# Patient Record
Sex: FEMALE | Race: WHITE | NOT HISPANIC OR LATINO | Employment: OTHER | URBAN - METROPOLITAN AREA
[De-identification: names, ages, dates, MRNs, and addresses within clinical notes are randomized per-mention and may not be internally consistent; named-entity substitution may affect disease eponyms.]

---

## 2021-01-14 DIAGNOSIS — Z23 NEED FOR VACCINATION: ICD-10-CM

## 2022-07-20 ENCOUNTER — OFFICE VISIT (OUTPATIENT)
Dept: URGENT CARE | Facility: CLINIC | Age: 82
End: 2022-07-20
Payer: MEDICARE

## 2022-07-20 VITALS
OXYGEN SATURATION: 96 % | HEIGHT: 64 IN | TEMPERATURE: 98.6 F | DIASTOLIC BLOOD PRESSURE: 80 MMHG | HEART RATE: 78 BPM | SYSTOLIC BLOOD PRESSURE: 140 MMHG | BODY MASS INDEX: 23.9 KG/M2 | WEIGHT: 140 LBS | RESPIRATION RATE: 12 BRPM

## 2022-07-20 DIAGNOSIS — S61.451A CAT BITE OF RIGHT HAND, INITIAL ENCOUNTER: ICD-10-CM
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
## 2022-07-20 PROCEDURE — 90714 TD VACC NO PRESV 7 YRS+ IM: CPT | Performed by: PHYSICIAN ASSISTANT

## 2022-07-20 PROCEDURE — 90471 IMMUNIZATION ADMIN: CPT | Performed by: PHYSICIAN ASSISTANT

## 2022-07-20 PROCEDURE — 99204 OFFICE O/P NEW MOD 45 MIN: CPT | Mod: 25 | Performed by: PHYSICIAN ASSISTANT

## 2022-07-20 RX ORDER — AMOXICILLIN AND CLAVULANATE POTASSIUM 500; 125 MG/1; MG/1
1 TABLET, FILM COATED ORAL 2 TIMES DAILY
Qty: 14 TABLET | Refills: 0 | Status: SHIPPED | OUTPATIENT
Start: 2022-07-20 | End: 2022-07-27

## 2022-07-20 ASSESSMENT — ENCOUNTER SYMPTOMS
NAUSEA: 0
FEVER: 0
HEADACHES: 0
TINGLING: 0
DIZZINESS: 0
MYALGIAS: 0
WEAKNESS: 0
CHILLS: 0
PALPITATIONS: 0
VOMITING: 0

## 2022-07-20 NOTE — PROGRESS NOTES
Subjective:     CHIEF COMPLAINT  Chief Complaint   Patient presents with   • Animal Bite     X 1 day, cat bite, on RT hand, puncture wound. Redness, swelling       HPI  Kadie Ashford is a very pleasant 82 y.o. female who presents to the clinic after being bit by her cat in her right hand last night.  Patient almost immediately developed swelling and redness.  States a slight restraining clear fluid.  She outlined the area this morning and the redness is gradually grown.  Currently on minimal pain.  No ascending redness or streaking.  Denies any fevers, chills or myalgias.  No numbness or tingling distally.  Thoroughly irrigated the bite with soap and water.  States she was hospitalized for 5 days approximately 5 years ago for a cat bite that became infected.    REVIEW OF SYSTEMS  Review of Systems   Constitutional: Negative for chills, fever and malaise/fatigue.   Cardiovascular: Negative for chest pain and palpitations.   Gastrointestinal: Negative for nausea and vomiting.   Musculoskeletal: Negative for joint pain and myalgias.   Skin:        Cat bite with surrounding redness to the right hand   Neurological: Negative for dizziness, tingling, weakness and headaches.       PAST MEDICAL HISTORY  There are no problems to display for this patient.      SURGICAL HISTORY   has a past surgical history that includes appendectomy; hysterectomy laparoscopy; and other.    ALLERGIES  Allergies   Allergen Reactions   • Food      Allergic to Scallops.   • Nkda [No Known Drug Allergy]    • Sulfa Drugs        CURRENT MEDICATIONS  Home Medications     Reviewed by Jeovany Fairbanks P.A.-C. (Physician Assistant) on 07/20/22 at 1104  Med List Status: <None>   Medication Last Dose Status   diphenhydrAMINE (BENADRYL) 25 MG capsule Not Taking Active   Estrogens Conjugated (PREMARIN PO) Not Taking Active   hydrocodone-APAP (NORCO) 5-325 MG TABS Not Taking Active   hydrocodone/apap  (NORCO)  MG TABS Not Taking Active  "  hydrocortisone 1 % CREA Not Taking Active   moxifloxacin (AVELOX) 400 MG TABS Not Taking Active   Omeprazole (PRILOSEC PO) Not Taking Active   Omeprazole 20 MG TBEC Not Taking Active   Potassium Chloride (KCL-20 PO) Not Taking Active   sulfamethoxazole-trimethoprim (BACTRIM DS) 800-160 MG tablet Not Taking Active                SOCIAL HISTORY  Social History     Tobacco Use   • Smoking status: Never Smoker   • Smokeless tobacco: Never Used   • Tobacco comment: quit in 1976   Vaping Use   • Vaping Use: Never used   Substance and Sexual Activity   • Alcohol use: Yes     Comment: occ   • Drug use: No   • Sexual activity: Not on file       FAMILY HISTORY  History reviewed. No pertinent family history.       Objective:     VITAL SIGNS: BP (!) 140/80   Pulse 78   Temp 37 °C (98.6 °F) (Temporal)   Resp 12   Ht 1.626 m (5' 4\")   Wt 63.5 kg (140 lb)   SpO2 96%   Breastfeeding No   BMI 24.03 kg/m²     PHYSICAL EXAM  Physical Exam  Constitutional:       Appearance: Normal appearance.   HENT:      Head: Normocephalic and atraumatic.   Eyes:      Conjunctiva/sclera: Conjunctivae normal.   Cardiovascular:      Rate and Rhythm: Normal rate and regular rhythm.      Pulses: Normal pulses.      Heart sounds: Normal heart sounds.   Pulmonary:      Effort: Pulmonary effort is normal.   Musculoskeletal:      Cervical back: Normal range of motion.   Skin:     Comments: Right hand: Healing puncture wound present to the dorsal aspect of the right hand between the first and second digit.  Large area of surrounding erythema measuring approximately 4.5 cm in size.  No active drainage.  No underlying fluctuance.  No ascending redness or streaking.  Sensation intact distally.  Full and pain-free range of motion of the right hand and wrist   Neurological:      General: No focal deficit present.      Mental Status: She is alert and oriented to person, place, and time. Mental status is at baseline.         Assessment/Plan:     1. Cat " bite of right hand, initial encounter  - TD Preservative Free =>8yo IM  - cefTRIAXone (ROCEPHIN) 500 mg, lidocaine (XYLOCAINE) 1 % 1.8 mL for IM use  - amoxicillin-clavulanate (AUGMENTIN) 500-125 MG Tab; Take 1 Tablet by mouth 2 times a day for 7 days.  Dispense: 14 Tablet; Refill: 0      MDM/Comments:    Patient presents with cat bite to her right hand approximately 18 hours ago.  She has had worsening redness over the last 12 hours.  Currently no active discharge or drainage present.  No underlying fluctuance.  Area is warm to the touch.  Will start on empiric antibiotics at this time.  500 mg IM Rocephin provided in clinic.  Will start patient on Augmentin daily.  Currently have no current labs on the patient.  Labs from 12+ years ago revealed kidney function around a GFR of 60.  Patient states she sees her PCP yearly and has not had any mention of decreased kidney function.  We will start the patient on renally dosed course of Augmentin.  Td updated in clinic.  Wound care instructions discussed.  Strict ED precautions discussed for any worsening redness, ascending streaking, worsening pain, fevers, chills, nausea or vomiting.  Patient verbalized understanding of this.    Differential diagnosis, natural history, supportive care, and indications for immediate follow-up discussed. All questions answered. Patient agrees with the plan of care.    Follow-up as needed if symptoms worsen or fail to improve to PCP, Urgent care or Emergency Room.    I have personally reviewed prior external notes and test results pertinent to today's visit.  I have independently reviewed and interpreted all diagnostics ordered during this urgent care acute visit.   Discussed management options (risks,benefits, and alternatives to treatment). Pt expresses understanding and the treatment plan was agreed upon. Questions were encouraged and answered to pt's satisfaction.    Please note that this dictation was created using voice recognition  software. I have made a reasonable attempt to correct obvious errors, but I expect that there are errors of grammar and possibly content that I did not discover before finalizing the note.

## 2023-05-28 ENCOUNTER — OFFICE VISIT (OUTPATIENT)
Dept: URGENT CARE | Facility: CLINIC | Age: 83
End: 2023-05-28
Payer: MEDICARE

## 2023-05-28 VITALS
HEART RATE: 79 BPM | SYSTOLIC BLOOD PRESSURE: 138 MMHG | OXYGEN SATURATION: 96 % | BODY MASS INDEX: 24.24 KG/M2 | RESPIRATION RATE: 16 BRPM | HEIGHT: 64 IN | TEMPERATURE: 97.4 F | DIASTOLIC BLOOD PRESSURE: 86 MMHG | WEIGHT: 142 LBS

## 2023-05-28 DIAGNOSIS — T14.8XXA INFECTED BITE WOUND: ICD-10-CM

## 2023-05-28 DIAGNOSIS — L08.9 INFECTED BITE WOUND: ICD-10-CM
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
## 2023-05-28 DIAGNOSIS — S61.459A CAT BITE OF HAND, INITIAL ENCOUNTER: ICD-10-CM

## 2023-05-28 PROCEDURE — 99213 OFFICE O/P EST LOW 20 MIN: CPT

## 2023-05-28 PROCEDURE — 3079F DIAST BP 80-89 MM HG: CPT

## 2023-05-28 PROCEDURE — 3075F SYST BP GE 130 - 139MM HG: CPT

## 2023-05-28 RX ORDER — FLUOROURACIL 50 MG/G
CREAM TOPICAL
COMMUNITY
End: 2023-05-28

## 2023-05-28 RX ORDER — AMOXICILLIN AND CLAVULANATE POTASSIUM 875; 125 MG/1; MG/1
1 TABLET, FILM COATED ORAL 2 TIMES DAILY
Qty: 14 TABLET | Refills: 0 | Status: SHIPPED | OUTPATIENT
Start: 2023-05-28 | End: 2023-06-04

## 2023-05-28 ASSESSMENT — ENCOUNTER SYMPTOMS
NAUSEA: 0
FEVER: 0
VOMITING: 0
CHILLS: 0

## 2023-05-28 NOTE — PROGRESS NOTES
Subjective     Kadie Ashford is a 83 y.o. female who presents with Animal Bite (X1day, Cat bite, right arm, red, hot to the touch )            Animal Bite  This is a new problem. The current episode started yesterday. The problem has been gradually worsening. Pertinent negatives include no chills, fever, nausea or vomiting. She has tried NSAIDs and ice (wash, hydrogen peroxide) for the symptoms. The treatment provided mild relief.     Patient presents with symptoms that started yesterday.  She reports that she was just making his bed when her cat bit her on the right hand/forearm.  She denies any pain but reports the area to be tender to touch.  She noted redness and increased swelling.  She has been applying ice and taking ibuprofen, providing some relief.  She reports her cat to be up-to-date on its rabies vaccine.      Patient's current problem list, medications, and past medical/surgical history were reviewed in Epic.    PMH:  has no past medical history of Angina, Arrhythmia, Arthritis, ASTHMA, Backpain, Bronchitis, Cancer (HCC), CATARACT, Congestive heart failure (HCC), COPD, Diabetes, Dialysis, Glaucoma, Heart murmur, Heart valve disease, Hypertension, Indigestion, Infectious disease, Jaundice, Myocardial infarct (HCC), Other specified symptom associated with female genital organs, Pacemaker, Personal history of venous thrombosis and embolism, Pneumonia, Psychiatric problem, Renal disorder, Rheumatic fever, Seizure (HCC), Stroke (HCC), Unspecified disorder of thyroid, Unspecified hemorrhagic conditions, or Unspecified urinary incontinence.  MEDS: No current outpatient medications on file.  ALLERGIES:   Allergies   Allergen Reactions    Food      Allergic to Scallops.    Nkda [No Known Drug Allergy]     Sulfa Drugs      SURGHX:   Past Surgical History:   Procedure Laterality Date    APPENDECTOMY      HYSTERECTOMY LAPAROSCOPY      OTHER      Nose SX in 1959     SOCHX:  reports that she has never smoked. She has  "never used smokeless tobacco. She reports that she does not currently use alcohol. She reports that she does not use drugs.  FH: Reviewed with patient, not pertinent to this visit.       Review of Systems   Constitutional:  Negative for chills and fever.   Gastrointestinal:  Negative for nausea and vomiting.   All other systems reviewed and are negative.             Objective     /86   Pulse 79   Temp 36.3 °C (97.4 °F) (Temporal)   Resp 16   Ht 1.626 m (5' 4\")   Wt 64.4 kg (142 lb)   SpO2 96%   BMI 24.37 kg/m²             Assessment & Plan     1. Cat bite of hand, initial encounter    - amoxicillin-clavulanate (AUGMENTIN) 875-125 MG Tab; Take 1 Tablet by mouth 2 times a day for 7 days.  Dispense: 14 Tablet; Refill: 0    2. Infected bite wound    - amoxicillin-clavulanate (AUGMENTIN) 875-125 MG Tab; Take 1 Tablet by mouth 2 times a day for 7 days.  Dispense: 14 Tablet; Refill: 0      Patient presents with infected wound from a cat bite.  She is prescribed Augmentin twice daily for 7 days.  Instructed to complete the 7-day course of antibiotics.  Instructed to keep the area clean and dry, wash with mild soap and water daily.  Continue ibuprofen/or Tylenol as needed for pain relief.  Elevate the area.  Apply ice as needed.  Discussed treatment plan with patient, she is agreeable and verbalized understanding.  Educated patient on signs and symptoms watch out for, when to return to the clinic or go to the ER.      Electronically Signed by ERIK Hinojosa                   "

## 2023-06-01 ENCOUNTER — OFFICE VISIT (OUTPATIENT)
Dept: URGENT CARE | Facility: CLINIC | Age: 83
End: 2023-06-01
Payer: MEDICARE

## 2023-06-01 VITALS
HEIGHT: 64 IN | DIASTOLIC BLOOD PRESSURE: 78 MMHG | SYSTOLIC BLOOD PRESSURE: 130 MMHG | TEMPERATURE: 97.6 F | OXYGEN SATURATION: 96 % | HEART RATE: 76 BPM | WEIGHT: 142 LBS | BODY MASS INDEX: 24.24 KG/M2 | RESPIRATION RATE: 14 BRPM
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
## 2023-06-01 PROCEDURE — 3075F SYST BP GE 130 - 139MM HG: CPT

## 2023-06-01 PROCEDURE — 3078F DIAST BP <80 MM HG: CPT

## 2023-06-01 PROCEDURE — 99213 OFFICE O/P EST LOW 20 MIN: CPT

## 2023-06-01 RX ORDER — AMOXICILLIN AND CLAVULANATE POTASSIUM 875; 125 MG/1; MG/1
1 TABLET, FILM COATED ORAL 2 TIMES DAILY
Qty: 6 TABLET | Refills: 0 | Status: SHIPPED | OUTPATIENT
Start: 2023-06-01 | End: 2023-06-04

## 2023-06-01 NOTE — PROGRESS NOTES
"Subjective:   Kadie Ashford is a 83 y.o. female who presents for Cat Bite (Re-evaluation for a cat bite on 5/28/23. RT arm.)      HPI:    Patient presents to urgent care with her  for wound check  Sustained cat bite on the right forearm 4 days ago  Started on Augmentin by  on 05/28/23  Reports slow improvement since she started the antibiotics.    has been documenting progression with pictures and circling the area of erythema, edema with black marker   states she had rapid improvement the first day, but there has been somewhat slower improvement in the wound in the past day.   Denies fever, chills, night sweats.  Denies n/v/d  Denies arthralgia, numbness/tingling, weakness, pain with ROM      ROS As above in HPI    Medications:    Current Outpatient Medications on File Prior to Visit   Medication Sig Dispense Refill    amoxicillin-clavulanate (AUGMENTIN) 875-125 MG Tab Take 1 Tablet by mouth 2 times a day for 7 days. 14 Tablet 0     No current facility-administered medications on file prior to visit.        Allergies:   Food, Nkda [no known drug allergy], and Sulfa drugs    Problem List:   There is no problem list on file for this patient.       Surgical History:  Past Surgical History:   Procedure Laterality Date    APPENDECTOMY      HYSTERECTOMY LAPAROSCOPY      OTHER      Nose SX in 1959       Past Social Hx:   Social History     Tobacco Use    Smoking status: Never    Smokeless tobacco: Never    Tobacco comments:     quit in 1976   Vaping Use    Vaping Use: Never used   Substance Use Topics    Alcohol use: Not Currently     Comment: occ    Drug use: No          Problem list, medications, and allergies reviewed by myself today in Epic.     Objective:     /78   Pulse 76   Temp 36.4 °C (97.6 °F) (Temporal)   Resp 14   Ht 1.626 m (5' 4\")   Wt 64.4 kg (142 lb)   SpO2 96%   BMI 24.37 kg/m²     Physical Exam  Vitals and nursing note reviewed.   Constitutional:       General: She is " not in acute distress.     Appearance: Normal appearance. She is not ill-appearing or diaphoretic.   HENT:      Head: Normocephalic and atraumatic.   Cardiovascular:      Rate and Rhythm: Normal rate and regular rhythm.      Heart sounds: Normal heart sounds.   Pulmonary:      Effort: Pulmonary effort is normal.      Breath sounds: Normal breath sounds. No stridor. No wheezing, rhonchi or rales.   Chest:      Chest wall: No tenderness.   Abdominal:      General: Bowel sounds are normal.      Palpations: Abdomen is soft.   Musculoskeletal:         General: Swelling present. Normal range of motion.   Skin:     General: Skin is warm and dry.      Capillary Refill: Capillary refill takes less than 2 seconds.      Findings: Erythema present. No rash.      Comments: Dorsum of right distal forearm has large area of erythema, edema. No warmth. There is a central puncture wound with stable eschar. Not tender to palpation. No drainage. +AROM. Strength is normal and symmetric, sensation is intact to light and sharp touch, cap refill is less than 2 seconds, 2+ radial pulse.   Neurological:      Mental Status: She is alert and oriented to person, place, and time.       Assessment/Plan:     Diagnosis and associated orders:   1. Cat bite, subsequent encounter  - amoxicillin-clavulanate (AUGMENTIN) 875-125 MG Tab; Take 1 Tablet by mouth 2 times a day for 3 days.  Dispense: 6 Tablet; Refill: 0        Comments/MDM:     There does not seem to be a retained foreign body, nerve injury, vascular injury, tendon injury, or bone injury. VSS, no signs of systemic infection. Reviewed pictures on patient's 's phone and infection has improved. Will extend antibiotic coverage for an additional 3 days, for a total of 10 days in total, due to concerns of slow progression. Recommended scheduled Tylenol, ice packs and warm compresses, elevation of right arm. Follow up with PCP or return to  in 3 days for wound check if needed. Strict return  to ER precautions reviewed. Patient and her  verbalized understanding and consented to plan of care.           Please note that this dictation was created using voice recognition software. I have made a reasonable attempt to correct obvious errors, but I expect that there are errors of grammar and possibly content that I did not discover before finalizing the note.    This note was electronically signed by Zulay Glover DNP

## 2023-08-26 ENCOUNTER — APPOINTMENT (OUTPATIENT)
Dept: URGENT CARE | Facility: CLINIC | Age: 83
End: 2023-08-26

## 2024-01-01 ENCOUNTER — HOME CARE VISIT (OUTPATIENT)
Dept: HOSPICE | Facility: HOSPICE | Age: 84
End: 2024-01-01
Payer: MEDICARE

## 2024-01-01 ENCOUNTER — TELEPHONE (OUTPATIENT)
Dept: HEALTH INFORMATION MANAGEMENT | Facility: OTHER | Age: 84
End: 2024-01-01

## 2024-01-01 VITALS — RESPIRATION RATE: 40 BRPM | HEART RATE: 176 BPM

## 2024-01-01 VITALS — SYSTOLIC BLOOD PRESSURE: 100 MMHG | DIASTOLIC BLOOD PRESSURE: 60 MMHG | HEART RATE: 76 BPM | RESPIRATION RATE: 20 BRPM

## 2024-01-01 VITALS — RESPIRATION RATE: 16 BRPM | HEART RATE: 100 BPM

## 2024-01-01 PROCEDURE — G0299 HHS/HOSPICE OF RN EA 15 MIN: HCPCS

## 2024-01-01 RX ADMIN — LORAZEPAM 0.5 MG: 2 CONCENTRATE ORAL at 09:15

## 2024-01-01 RX ADMIN — MORPHINE SULFATE 20 MG: 100 SOLUTION ORAL at 09:10

## 2024-01-01 SDOH — ECONOMIC STABILITY: GENERAL

## 2024-01-01 ASSESSMENT — ENCOUNTER SYMPTOMS
FATIGUES EASILY: 1
DRY SKIN: 1
DECREASED ORAL INTAKE: 1
COUGH: 1
MUSCLE WEAKNESS: 1
VOMITING: 1
PAIN LOCATION: STOMACH
INCREASED FATIGUE: 1
SUBJECTIVE PAIN PROGRESSION: UNCHANGED
DRY SKIN: 1
PAIN LOCATION - PAIN SEVERITY: 0/10
INCREASED SLEEPING: 1
LOWEST PAIN SEVERITY IN PAST 24 HOURS: 0/10
CONTUSION: 1
PAIN LOCATION: STOMACH
FATIGUES EASILY: 1
STOOL FREQUENCY: LESS THAN DAILY
SHORTNESS OF BREATH: 1
PAIN LOCATION: STOMACH
DEPRESSED MOOD: 1
SUBJECTIVE PAIN PROGRESSION: GRADUALLY IMPROVING
COUGH: 1
LAST BOWEL MOVEMENT: 67161
NAUSEA: 1
FATIGUE: 1
NAUSEA: 1
PAIN: 1
FATIGUES EASILY: 1
ORTHOPNEA: 1
FATIGUES EASILY: 1
PAIN: 1
DYSPNEA ACTIVITY LEVEL: AFTER AMBULATING MORE THAN 20 FT
COUGH: 1
MUSCLE WEAKNESS: 1
ORTHOPNEA: 1
DECREASED TO NO URINARY OUTPUT: 1
PAIN LOCATION - PAIN FREQUENCY: INTERMITTENT
STOOL FREQUENCY: DAILY
HIGHEST PAIN SEVERITY IN PAST 24 HOURS: 7/10
PAIN LOCATION - PAIN SEVERITY: 3/10
FATIGUE: 1
ORTHOPNEA: 1
BOWEL PATTERN NORMAL: 1
COUGH CHARACTERISTICS: DRY
SHORTNESS OF BREATH: 1
DYSPNEA ACTIVITY LEVEL: AFTER AMBULATING 10 - 20 FT
FATIGUE: 1
PAIN LOCATION - PAIN SEVERITY: 0/10
HIGHEST PAIN SEVERITY IN PAST 24 HOURS: 6/10
MUSCLE WEAKNESS: 1
PAIN LOCATION - RELIEVING FACTORS: LAYING DOWN
FORGETFULNESS: 1
SHORTNESS OF BREATH: 1
LOWEST PAIN SEVERITY IN PAST 24 HOURS: 0/10
PAIN: 1
PAIN LOCATION - EXACERBATING FACTORS: EMPTY STOMACH
PAIN SEVERITY GOAL: 0/10
LAST BOWEL MOVEMENT: 67148
LAST BOWEL MOVEMENT: 67152
DYSPNEA ACTIVITY LEVEL: AFTER AMBULATING MORE THAN 20 FT
PAIN SEVERITY GOAL: 0/10

## 2024-01-01 ASSESSMENT — PAIN SCALES - PAIN ASSESSMENT IN ADVANCED DEMENTIA (PAINAD)
BODYLANGUAGE: 0 - RELAXED.
FACIALEXPRESSION: 0 - SMILING OR INEXPRESSIVE.
NEGVOCALIZATION: 0 - NONE.
CONSOLABILITY: 0 - NO NEED TO CONSOLE.
TOTALSCORE: 2

## 2024-01-01 ASSESSMENT — SOCIAL DETERMINANTS OF HEALTH (SDOH): ACTIVE STRESSOR - HEALTH CHANGES: 1

## 2024-01-01 ASSESSMENT — ACTIVITIES OF DAILY LIVING (ADL): MONEY MANAGEMENT (EXPENSES/BILLS): NEEDS ASSISTANCE

## 2024-04-23 ENCOUNTER — HOSPITAL ENCOUNTER (OUTPATIENT)
Dept: RADIOLOGY | Facility: MEDICAL CENTER | Age: 84
End: 2024-04-23
Attending: FAMILY MEDICINE
Payer: MEDICARE

## 2024-04-23 DIAGNOSIS — R13.10 DYSPHAGIA, UNSPECIFIED TYPE: ICD-10-CM

## 2024-04-23 PROCEDURE — 74220 X-RAY XM ESOPHAGUS 1CNTRST: CPT

## 2024-07-04 ENCOUNTER — OFFICE VISIT (OUTPATIENT)
Dept: URGENT CARE | Facility: CLINIC | Age: 84
End: 2024-07-04
Payer: MEDICARE

## 2024-07-04 VITALS
HEIGHT: 64 IN | BODY MASS INDEX: 21.34 KG/M2 | TEMPERATURE: 98.5 F | HEART RATE: 86 BPM | SYSTOLIC BLOOD PRESSURE: 132 MMHG | DIASTOLIC BLOOD PRESSURE: 70 MMHG | RESPIRATION RATE: 14 BRPM | OXYGEN SATURATION: 98 % | WEIGHT: 125 LBS

## 2024-07-04 DIAGNOSIS — L08.9 CAT BITE OF LEFT HAND WITH INFECTION, INITIAL ENCOUNTER: ICD-10-CM
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
## 2024-07-04 DIAGNOSIS — S61.452A CAT BITE OF LEFT HAND WITH INFECTION, INITIAL ENCOUNTER: ICD-10-CM

## 2024-07-04 PROCEDURE — 99213 OFFICE O/P EST LOW 20 MIN: CPT | Performed by: PHYSICIAN ASSISTANT

## 2024-07-04 PROCEDURE — 3078F DIAST BP <80 MM HG: CPT | Performed by: PHYSICIAN ASSISTANT

## 2024-07-04 PROCEDURE — 3075F SYST BP GE 130 - 139MM HG: CPT | Performed by: PHYSICIAN ASSISTANT

## 2024-07-04 RX ORDER — AMOXICILLIN AND CLAVULANATE POTASSIUM 875; 125 MG/1; MG/1
1 TABLET, FILM COATED ORAL 2 TIMES DAILY
Qty: 14 TABLET | Refills: 0 | Status: SHIPPED | OUTPATIENT
Start: 2024-07-04 | End: 2024-07-11

## 2024-07-04 ASSESSMENT — ENCOUNTER SYMPTOMS
FEVER: 0
CHILLS: 0

## 2024-08-29 ENCOUNTER — APPOINTMENT (OUTPATIENT)
Dept: RADIOLOGY | Facility: MEDICAL CENTER | Age: 84
DRG: 444 | End: 2024-08-29
Attending: EMERGENCY MEDICINE
Payer: MEDICARE

## 2024-08-29 ENCOUNTER — HOSPITAL ENCOUNTER (INPATIENT)
Facility: MEDICAL CENTER | Age: 84
End: 2024-08-29
Attending: EMERGENCY MEDICINE | Admitting: HOSPITALIST
Payer: MEDICARE

## 2024-08-29 DIAGNOSIS — R16.0 LIVER MASSES: ICD-10-CM

## 2024-08-29 DIAGNOSIS — K83.1 OBSTRUCTIVE JAUNDICE: ICD-10-CM

## 2024-08-29 DIAGNOSIS — R17 JAUNDICE: ICD-10-CM

## 2024-08-29 DIAGNOSIS — R93.89 ABNORMAL CT SCAN: ICD-10-CM

## 2024-08-29 PROBLEM — D72.829 LEUKOCYTOSIS: Status: ACTIVE | Noted: 2024-08-29

## 2024-08-29 PROBLEM — R03.0 ELEVATED BLOOD PRESSURE READING: Status: ACTIVE | Noted: 2024-08-29

## 2024-08-29 PROBLEM — D64.9 ANEMIA: Status: ACTIVE | Noted: 2024-08-29

## 2024-08-29 PROBLEM — E87.6 HYPOKALEMIA: Status: ACTIVE | Noted: 2024-08-29

## 2024-08-29 PROBLEM — E87.1 HYPONATREMIA: Status: ACTIVE | Noted: 2024-08-29

## 2024-08-29 PROBLEM — E86.0 DEHYDRATION: Status: ACTIVE | Noted: 2024-08-29

## 2024-08-29 PROBLEM — R74.8 ELEVATED LIVER ENZYMES: Status: ACTIVE | Noted: 2024-08-29

## 2024-08-29 PROBLEM — Z71.89 ACP (ADVANCE CARE PLANNING): Status: ACTIVE | Noted: 2024-08-29

## 2024-08-29 PROBLEM — E43 SEVERE PROTEIN-CALORIE MALNUTRITION (HCC): Status: ACTIVE | Noted: 2024-08-29

## 2024-08-29 LAB
ALBUMIN SERPL BCP-MCNC: 3.3 G/DL (ref 3.2–4.9)
ALBUMIN/GLOB SERPL: 0.9 G/DL
ALP SERPL-CCNC: 536 U/L (ref 30–99)
ALT SERPL-CCNC: 102 U/L (ref 2–50)
ANION GAP SERPL CALC-SCNC: 15 MMOL/L (ref 7–16)
APPEARANCE UR: CLEAR
APTT PPP: 28.2 SEC (ref 24.7–36)
AST SERPL-CCNC: 114 U/L (ref 12–45)
BASOPHILS # BLD AUTO: 0.4 % (ref 0–1.8)
BASOPHILS # BLD: 0.05 K/UL (ref 0–0.12)
BILIRUB SERPL-MCNC: 4.5 MG/DL (ref 0.1–1.5)
BILIRUB UR QL STRIP.AUTO: ABNORMAL
BUN SERPL-MCNC: 11 MG/DL (ref 8–22)
CALCIUM ALBUM COR SERPL-MCNC: 10 MG/DL (ref 8.5–10.5)
CALCIUM SERPL-MCNC: 9.4 MG/DL (ref 8.4–10.2)
CHLORIDE SERPL-SCNC: 95 MMOL/L (ref 96–112)
CO2 SERPL-SCNC: 24 MMOL/L (ref 20–33)
COLOR UR: ABNORMAL
CREAT SERPL-MCNC: 0.6 MG/DL (ref 0.5–1.4)
EOSINOPHIL # BLD AUTO: 0.03 K/UL (ref 0–0.51)
EOSINOPHIL NFR BLD: 0.2 % (ref 0–6.9)
ERYTHROCYTE [DISTWIDTH] IN BLOOD BY AUTOMATED COUNT: 56 FL (ref 35.9–50)
GFR SERPLBLD CREATININE-BSD FMLA CKD-EPI: 88 ML/MIN/1.73 M 2
GLOBULIN SER CALC-MCNC: 3.7 G/DL (ref 1.9–3.5)
GLUCOSE SERPL-MCNC: 145 MG/DL (ref 65–99)
GLUCOSE UR STRIP.AUTO-MCNC: NEGATIVE MG/DL
HCT VFR BLD AUTO: 32.6 % (ref 37–47)
HGB BLD-MCNC: 10.5 G/DL (ref 12–16)
IMM GRANULOCYTES # BLD AUTO: 0.1 K/UL (ref 0–0.11)
IMM GRANULOCYTES NFR BLD AUTO: 0.8 % (ref 0–0.9)
INR PPP: 1.09 (ref 0.87–1.13)
KETONES UR STRIP.AUTO-MCNC: NEGATIVE MG/DL
LEUKOCYTE ESTERASE UR QL STRIP.AUTO: NEGATIVE
LIPASE SERPL-CCNC: 33 U/L (ref 11–82)
LYMPHOCYTES # BLD AUTO: 1.34 K/UL (ref 1–4.8)
LYMPHOCYTES NFR BLD: 11.1 % (ref 22–41)
MCH RBC QN AUTO: 26.1 PG (ref 27–33)
MCHC RBC AUTO-ENTMCNC: 32.2 G/DL (ref 32.2–35.5)
MCV RBC AUTO: 81.1 FL (ref 81.4–97.8)
MICRO URNS: ABNORMAL
MONOCYTES # BLD AUTO: 1.49 K/UL (ref 0–0.85)
MONOCYTES NFR BLD AUTO: 12.4 % (ref 0–13.4)
NEUTROPHILS # BLD AUTO: 9.05 K/UL (ref 1.82–7.42)
NEUTROPHILS NFR BLD: 75.1 % (ref 44–72)
NITRITE UR QL STRIP.AUTO: NEGATIVE
NRBC # BLD AUTO: 0 K/UL
NRBC BLD-RTO: 0 /100 WBC (ref 0–0.2)
PH UR STRIP.AUTO: 6.5 [PH] (ref 5–8)
PLATELET # BLD AUTO: 500 K/UL (ref 164–446)
PMV BLD AUTO: 9.2 FL (ref 9–12.9)
POTASSIUM SERPL-SCNC: 3.5 MMOL/L (ref 3.6–5.5)
PROT SERPL-MCNC: 7 G/DL (ref 6–8.2)
PROT UR QL STRIP: NEGATIVE MG/DL
PROTHROMBIN TIME: 14.6 SEC (ref 12–14.6)
RBC # BLD AUTO: 4.02 M/UL (ref 4.2–5.4)
RBC UR QL AUTO: NEGATIVE
SODIUM SERPL-SCNC: 134 MMOL/L (ref 135–145)
SP GR UR STRIP.AUTO: <=1.005
WBC # BLD AUTO: 12.1 K/UL (ref 4.8–10.8)

## 2024-08-29 PROCEDURE — 99285 EMERGENCY DEPT VISIT HI MDM: CPT

## 2024-08-29 PROCEDURE — 83690 ASSAY OF LIPASE: CPT

## 2024-08-29 PROCEDURE — 85610 PROTHROMBIN TIME: CPT

## 2024-08-29 PROCEDURE — 700102 HCHG RX REV CODE 250 W/ 637 OVERRIDE(OP): Performed by: HOSPITALIST

## 2024-08-29 PROCEDURE — 99497 ADVNCD CARE PLAN 30 MIN: CPT | Performed by: HOSPITALIST

## 2024-08-29 PROCEDURE — 99223 1ST HOSP IP/OBS HIGH 75: CPT | Mod: 25,AI | Performed by: HOSPITALIST

## 2024-08-29 PROCEDURE — 770001 HCHG ROOM/CARE - MED/SURG/GYN PRIV*

## 2024-08-29 PROCEDURE — 74177 CT ABD & PELVIS W/CONTRAST: CPT

## 2024-08-29 PROCEDURE — 770006 HCHG ROOM/CARE - MED/SURG/GYN SEMI*

## 2024-08-29 PROCEDURE — 85730 THROMBOPLASTIN TIME PARTIAL: CPT

## 2024-08-29 PROCEDURE — 80053 COMPREHEN METABOLIC PANEL: CPT

## 2024-08-29 PROCEDURE — 81003 URINALYSIS AUTO W/O SCOPE: CPT

## 2024-08-29 PROCEDURE — 71045 X-RAY EXAM CHEST 1 VIEW: CPT

## 2024-08-29 PROCEDURE — 36415 COLL VENOUS BLD VENIPUNCTURE: CPT

## 2024-08-29 PROCEDURE — 700117 HCHG RX CONTRAST REV CODE 255: Performed by: EMERGENCY MEDICINE

## 2024-08-29 PROCEDURE — 94760 N-INVAS EAR/PLS OXIMETRY 1: CPT

## 2024-08-29 PROCEDURE — A9270 NON-COVERED ITEM OR SERVICE: HCPCS | Performed by: HOSPITALIST

## 2024-08-29 PROCEDURE — 85025 COMPLETE CBC W/AUTO DIFF WBC: CPT

## 2024-08-29 RX ORDER — ONDANSETRON 2 MG/ML
4 INJECTION INTRAMUSCULAR; INTRAVENOUS EVERY 4 HOURS PRN
Status: DISCONTINUED | OUTPATIENT
Start: 2024-08-29 | End: 2024-09-01 | Stop reason: HOSPADM

## 2024-08-29 RX ORDER — OXYCODONE HYDROCHLORIDE 5 MG/1
2.5 TABLET ORAL
Status: DISCONTINUED | OUTPATIENT
Start: 2024-08-29 | End: 2024-09-01 | Stop reason: HOSPADM

## 2024-08-29 RX ORDER — ACETAMINOPHEN 325 MG/1
650 TABLET ORAL EVERY 6 HOURS PRN
Status: DISCONTINUED | OUTPATIENT
Start: 2024-08-29 | End: 2024-09-01 | Stop reason: HOSPADM

## 2024-08-29 RX ORDER — HYDROXYZINE HYDROCHLORIDE 25 MG/1
1 TABLET, FILM COATED ORAL 2 TIMES DAILY PRN
COMMUNITY
Start: 2024-04-17

## 2024-08-29 RX ORDER — HYDROMORPHONE HYDROCHLORIDE 1 MG/ML
0.25 INJECTION, SOLUTION INTRAMUSCULAR; INTRAVENOUS; SUBCUTANEOUS
Status: DISCONTINUED | OUTPATIENT
Start: 2024-08-29 | End: 2024-09-01 | Stop reason: HOSPADM

## 2024-08-29 RX ORDER — AMOXICILLIN 250 MG
2 CAPSULE ORAL 2 TIMES DAILY
Status: DISCONTINUED | OUTPATIENT
Start: 2024-08-29 | End: 2024-09-01 | Stop reason: HOSPADM

## 2024-08-29 RX ORDER — VITAMIN B COMPLEX
2000 TABLET ORAL DAILY
COMMUNITY

## 2024-08-29 RX ORDER — ONDANSETRON 4 MG/1
4 TABLET, ORALLY DISINTEGRATING ORAL EVERY 4 HOURS PRN
Status: DISCONTINUED | OUTPATIENT
Start: 2024-08-29 | End: 2024-09-01 | Stop reason: HOSPADM

## 2024-08-29 RX ORDER — LABETALOL HYDROCHLORIDE 5 MG/ML
10 INJECTION, SOLUTION INTRAVENOUS EVERY 4 HOURS PRN
Status: DISCONTINUED | OUTPATIENT
Start: 2024-08-29 | End: 2024-09-01 | Stop reason: HOSPADM

## 2024-08-29 RX ORDER — OXYCODONE HYDROCHLORIDE 5 MG/1
5 TABLET ORAL
Status: DISCONTINUED | OUTPATIENT
Start: 2024-08-29 | End: 2024-09-01 | Stop reason: HOSPADM

## 2024-08-29 RX ORDER — IBUPROFEN 200 MG
200 TABLET ORAL EVERY 6 HOURS PRN
COMMUNITY

## 2024-08-29 RX ORDER — POLYETHYLENE GLYCOL 3350 17 G/17G
1 POWDER, FOR SOLUTION ORAL
Status: DISCONTINUED | OUTPATIENT
Start: 2024-08-29 | End: 2024-09-01 | Stop reason: HOSPADM

## 2024-08-29 RX ORDER — ONDANSETRON 4 MG/1
4 TABLET, ORALLY DISINTEGRATING ORAL EVERY 6 HOURS PRN
COMMUNITY
Start: 2024-04-17

## 2024-08-29 RX ADMIN — OXYCODONE HYDROCHLORIDE 2.5 MG: 5 TABLET ORAL at 17:50

## 2024-08-29 RX ADMIN — IOHEXOL 100 ML: 350 INJECTION, SOLUTION INTRAVENOUS at 15:59

## 2024-08-29 RX ADMIN — OXYCODONE HYDROCHLORIDE 2.5 MG: 5 TABLET ORAL at 21:15

## 2024-08-29 SDOH — ECONOMIC STABILITY: TRANSPORTATION INSECURITY
IN THE PAST 12 MONTHS, HAS THE LACK OF TRANSPORTATION KEPT YOU FROM MEDICAL APPOINTMENTS OR FROM GETTING MEDICATIONS?: NO

## 2024-08-29 SDOH — ECONOMIC STABILITY: TRANSPORTATION INSECURITY
IN THE PAST 12 MONTHS, HAS LACK OF RELIABLE TRANSPORTATION KEPT YOU FROM MEDICAL APPOINTMENTS, MEETINGS, WORK OR FROM GETTING THINGS NEEDED FOR DAILY LIVING?: NO

## 2024-08-29 ASSESSMENT — LIFESTYLE VARIABLES
TOTAL SCORE: 0
AVERAGE NUMBER OF DAYS PER WEEK YOU HAVE A DRINK CONTAINING ALCOHOL: 0
HAVE PEOPLE ANNOYED YOU BY CRITICIZING YOUR DRINKING: NO
CONSUMPTION TOTAL: NEGATIVE
HAVE YOU EVER FELT YOU SHOULD CUT DOWN ON YOUR DRINKING: NO
TOTAL SCORE: 0
EVER HAD A DRINK FIRST THING IN THE MORNING TO STEADY YOUR NERVES TO GET RID OF A HANGOVER: NO
ALCOHOL_USE: NO
DOES PATIENT WANT TO STOP DRINKING: NO
ON A TYPICAL DAY WHEN YOU DRINK ALCOHOL HOW MANY DRINKS DO YOU HAVE: 0
EVER FELT BAD OR GUILTY ABOUT YOUR DRINKING: NO
HOW MANY TIMES IN THE PAST YEAR HAVE YOU HAD 5 OR MORE DRINKS IN A DAY: 0
TOTAL SCORE: 0

## 2024-08-29 ASSESSMENT — COGNITIVE AND FUNCTIONAL STATUS - GENERAL
MOBILITY SCORE: 21
STANDING UP FROM CHAIR USING ARMS: A LITTLE
SUGGESTED CMS G CODE MODIFIER DAILY ACTIVITY: CH
WALKING IN HOSPITAL ROOM: A LITTLE
DAILY ACTIVITIY SCORE: 24
SUGGESTED CMS G CODE MODIFIER MOBILITY: CJ
CLIMB 3 TO 5 STEPS WITH RAILING: A LITTLE

## 2024-08-29 ASSESSMENT — ENCOUNTER SYMPTOMS
EYE DISCHARGE: 0
FEVER: 0
FOCAL WEAKNESS: 0
COUGH: 0
VOMITING: 0
SHORTNESS OF BREATH: 0
BRUISES/BLEEDS EASILY: 0
MYALGIAS: 0
EYE REDNESS: 0
CHILLS: 0
FLANK PAIN: 0
ABDOMINAL PAIN: 0
NERVOUS/ANXIOUS: 0
STRIDOR: 0

## 2024-08-29 ASSESSMENT — PATIENT HEALTH QUESTIONNAIRE - PHQ9
1. LITTLE INTEREST OR PLEASURE IN DOING THINGS: NOT AT ALL
2. FEELING DOWN, DEPRESSED, IRRITABLE, OR HOPELESS: NOT AT ALL
SUM OF ALL RESPONSES TO PHQ9 QUESTIONS 1 AND 2: 0
1. LITTLE INTEREST OR PLEASURE IN DOING THINGS: NOT AT ALL
2. FEELING DOWN, DEPRESSED, IRRITABLE, OR HOPELESS: NOT AT ALL
SUM OF ALL RESPONSES TO PHQ9 QUESTIONS 1 AND 2: 0

## 2024-08-29 ASSESSMENT — SOCIAL DETERMINANTS OF HEALTH (SDOH)
WITHIN THE PAST 12 MONTHS, YOU WORRIED THAT YOUR FOOD WOULD RUN OUT BEFORE YOU GOT THE MONEY TO BUY MORE: NEVER TRUE
IN THE PAST 12 MONTHS, HAS THE ELECTRIC, GAS, OIL, OR WATER COMPANY THREATENED TO SHUT OFF SERVICE IN YOUR HOME?: NO
WITHIN THE LAST YEAR, HAVE YOU BEEN AFRAID OF YOUR PARTNER OR EX-PARTNER?: NO
WITHIN THE PAST 12 MONTHS, THE FOOD YOU BOUGHT JUST DIDN'T LAST AND YOU DIDN'T HAVE MONEY TO GET MORE: NEVER TRUE
WITHIN THE LAST YEAR, HAVE YOU BEEN KICKED, HIT, SLAPPED, OR OTHERWISE PHYSICALLY HURT BY YOUR PARTNER OR EX-PARTNER?: NO
WITHIN THE LAST YEAR, HAVE YOU BEEN HUMILIATED OR EMOTIONALLY ABUSED IN OTHER WAYS BY YOUR PARTNER OR EX-PARTNER?: NO
WITHIN THE LAST YEAR, HAVE TO BEEN RAPED OR FORCED TO HAVE ANY KIND OF SEXUAL ACTIVITY BY YOUR PARTNER OR EX-PARTNER?: NO

## 2024-08-29 ASSESSMENT — PAIN DESCRIPTION - PAIN TYPE
TYPE: ACUTE PAIN
TYPE: ACUTE PAIN

## 2024-08-29 ASSESSMENT — FIBROSIS 4 INDEX: FIB4 SCORE: 1.92

## 2024-08-29 NOTE — ED NOTES
Pharmacy Medication Reconciliation      ~Medication reconciliation updated and complete per patient   ~Allergies have been verified and updated   ~No oral ABX within the last 30 days  ~Patient home pharmacy :  [facility] [phone]      ~Anticoagulants (rivaroxaban, apixaban, edoxaban, dabigatran, warfarin, enoxaparin) taken in the last 14 days? No

## 2024-08-29 NOTE — ED TRIAGE NOTES
Pt presents with daughter from home for RUQ pain, nausea, weakness, and decreased appetite with weight loss. +jaundice. No fever. Gallbladder is intact. Pt A&Ox4 and ambulatory.     Hx of bleeding ulcer.

## 2024-08-29 NOTE — H&P
Hospital Medicine History & Physical Note    Date of Service  8/29/2024    Primary Care Physician  Dale Cornejo M.D.    Consultants  JERED, Dr Mckay        Code Status  Full Code    Chief Complaint  Chief Complaint   Patient presents with    Abdominal Pain     History of Presenting Illness  Kadie Ashford is a 84 y.o. female with no known significant past medical history who presented 8/29/2024 with right upper quadrant, jaundice abdominal pain, generalized weakness and anorexia.  The patient has been having progressively worsening generalized weakness, easy fatigability and jaundice over the past few months.  The pain was initially mild but now is moderate.  She has noticed dark-colored urine and intermittent episodes of light-colored stools.  She denies having itching.  She denies having fevers or chills.      I discussed the plan of care with emergency physician, the patient and patient daughter and patient  present at bedside in the emergency room    Review of Systems  Review of Systems   Constitutional:  Positive for malaise/fatigue. Negative for chills and fever.   Eyes:  Negative for discharge and redness.   Respiratory:  Negative for cough, shortness of breath and stridor.    Cardiovascular:  Negative for chest pain and leg swelling.   Gastrointestinal:  Negative for abdominal pain and vomiting.   Genitourinary:  Negative for flank pain.   Musculoskeletal:  Negative for myalgias.   Skin:         Yellowish discoloration of skin and mucous membranes   Neurological:  Negative for focal weakness.   Endo/Heme/Allergies:  Does not bruise/bleed easily.   Psychiatric/Behavioral:  The patient is not nervous/anxious.      Past Medical History   has no past medical history on file.    Surgical History   has a past surgical history that includes appendectomy; hysterectomy laparoscopy; and other.     Family History  family history is not on file.      Social History   reports that she has never smoked. She  has never used smokeless tobacco. She reports that she does not currently use alcohol. She reports that she does not use drugs.    Allergies  Allergies   Allergen Reactions    Food      Allergic to Scallops.    Nkda [No Known Drug Allergy]     Sulfa Drugs      Medications  Prior to Admission Medications   Prescriptions Last Dose Informant Patient Reported? Taking?   Polyethyl Glycol-Propyl Glycol (LUBRICATING EYE DROPS OP) 8/28/2024 at am Patient, Family Member Yes Yes   Sig: Administer 1 Drop into affected eye(s) 1 time a day as needed (dry eyes).   hydrOXYzine HCl (ATARAX) 25 MG Tab 8/29/2024 at 1000 Patient, Family Member Yes Yes   Sig: Take 1 Tablet by mouth 2 times a day as needed.   ibuprofen (MOTRIN) 200 MG Tab few days ago at prn Patient, Family Member Yes Yes   Sig: Take 200 mg by mouth every 6 hours as needed for Mild Pain.   omeprazole (PRILOSEC) 20 MG delayed-release capsule 8/29/2024 at am Patient, Family Member Yes Yes   Sig: Take 20 mg by mouth every day.   ondansetron (ZOFRAN ODT) 4 MG TABLET DISPERSIBLE a couple weeks ago at prn Patient, Family Member Yes Yes   Sig: Take 4 mg by mouth every 6 hours as needed for Nausea/Vomiting.   vitamin D3 (CHOLECALCIFEROL) 1000 Unit (25 mcg) Tab 8/28/2024 at am Patient, Family Member Yes Yes   Sig: Take 2,000 Units by mouth every day.      Facility-Administered Medications: None     Physical Exam  Temp:  [36.1 °C (97 °F)-37.1 °C (98.7 °F)] 36.1 °C (97 °F)  Pulse:  [80-89] 88  Resp:  [18] 18  BP: (136-176)/() 171/76  SpO2:  [92 %-100 %] 100 %  Blood Pressure : (!) 148/69   Temperature: 37.1 °C (98.7 °F)   Pulse: 86   Respiration: 18   Pulse Oximetry: 97 %     Physical Exam  Constitutional:       General: She is not in acute distress.  HENT:      Head: Normocephalic and atraumatic.      Right Ear: External ear normal.      Left Ear: External ear normal.      Nose: No congestion or rhinorrhea.      Mouth/Throat:      Mouth: Mucous membranes are dry.       "Pharynx: No oropharyngeal exudate or posterior oropharyngeal erythema.   Eyes:      General: Scleral icterus present.         Right eye: No discharge.         Left eye: No discharge.      Conjunctiva/sclera: Conjunctivae normal.      Pupils: Pupils are equal, round, and reactive to light.   Cardiovascular:      Rate and Rhythm: Normal rate and regular rhythm.      Heart sounds:      No friction rub. No gallop.   Pulmonary:      Effort: Pulmonary effort is normal.   Abdominal:      General: Abdomen is flat.      Tenderness: There is abdominal tenderness (Right upper quadrant). There is no guarding or rebound.      Comments: Protuberant abdomen   Musculoskeletal:         General: No swelling.      Cervical back: Neck supple. No rigidity. No muscular tenderness.      Right lower leg: No edema.      Left lower leg: No edema.   Skin:     Capillary Refill: Capillary refill takes 2 to 3 seconds.      Coloration: Skin is jaundiced. Skin is not pale.      Findings: No bruising or erythema.   Neurological:      Mental Status: She is alert and oriented to person, place, and time.   Psychiatric:         Mood and Affect: Mood normal.         Judgment: Judgment normal.       Laboratory:  Recent Labs     08/29/24  1405   WBC 12.1*   RBC 4.02*   HEMOGLOBIN 10.5*   HEMATOCRIT 32.6*   MCV 81.1*   MCH 26.1*   MCHC 32.2   RDW 56.0*   PLATELETCT 500*   MPV 9.2     Recent Labs     08/29/24  1405   SODIUM 134*   POTASSIUM 3.5*   CHLORIDE 95*   CO2 24   GLUCOSE 145*   BUN 11   CREATININE 0.60   CALCIUM 9.4     Recent Labs     08/29/24  1405   ALTSGPT 102*   ASTSGOT 114*   ALKPHOSPHAT 536*   TBILIRUBIN 4.5*   LIPASE 33   GLUCOSE 145*     Recent Labs     08/29/24  1405   APTT 28.2   INR 1.09     No results for input(s): \"NTPROBNP\" in the last 72 hours.      No results for input(s): \"TROPONINT\" in the last 72 hours.    Imaging:  CT-ABDOMEN-PELVIS WITH   Final Result         1. There is extensive neoplastic involvement of the liver and " gallbladder.   2. Intrahepatic biliary ductal dilatation.   3. Upper abdominal lymphadenopathy.   4. Atherosclerosis including coronary artery disease.      DX-CHEST-PORTABLE (1 VIEW)   Final Result      Mild left basilar atelectasis versus infiltrate.        Assessment/Plan:  Justification for Admission Status  I anticipate this patient will require at least two midnights for appropriate medical management, necessitating inpatient admission because the patient has obstructive jaundice.  She will require GI consultation and ERCP for stent placement.    Patient will need a medical bed on medical service     * Obstructive jaundice- (present on admission)  Assessment & Plan  Presenting with progressively worsening right upper quadrant abdominal pain and jaundice  Likely multifactorial secondary to metastatic versus primary malignancy and biliary obstruction.  GI consulted to consider stent placement and endoscopic ultrasound.  Avoid hepatotoxins as much as possible.  I will order follow-up CMP     Abnormal CT scan showing extensive neoplastic involvement of the liver and gallbladder- (present on admission)  Assessment & Plan  Abnormal CT scan showing extensive neoplastic involvement of the liver and gallbladder   In addition there are multiple enlarged lymph nodes.  Case was discussed with surgical oncology, Dr. Llamas who recommended GI consultation to consider stent placement and endoscopic ultrasound for biopsy.  GI, Dr. Mckay consulted  The patient will need to follow-up with surgical oncology    Elevated liver enzymes- (present on admission)  Assessment & Plan  Likely multifactorial secondary to metastatic versus primary malignancy and biliary obstruction.  GI consulted to consider stent placement and endoscopic ultrasound.  Avoid hepatotoxins as much as possible.  I will order follow-up CMP    Severe protein-calorie malnutrition (HCC)- (present on admission)  Assessment & Plan  Patient has been having  progressively worsening anorexia with reduced oral intake over the past 1 month  The patient has temporal muscle wasting.  The patient has thenar and hypothenar wasting.   I will start on nutritional boost supplements.  Nutrition consult.  I will monitor his magnesium and phosphorus for potential refeeding.     Elevated blood pressure reading- (present on admission)  Assessment & Plan  No known history of primary hypertension  Likely secondary to severe pain  Multimodal pain control  I will start as needed labetalol for extreme hypertension  Consider starting scheduled antihypertensive medications according to blood pressure trend     Dehydration- (present on admission)  Assessment & Plan  Likely secondary to reduced oral intake   Encourage oral intake as tolerated, antiemetics as needed.  Intravenous hydration until adequate oral intake is achieved     Hypokalemia- (present on admission)  Assessment & Plan  Replacing, checking magnesium    Continue to monitor replace as needed     Hyponatremia- (present on admission)  Assessment & Plan  Hypovolemic hyponatremia  I expect Na to improve with IVF     Anemia- (present on admission)  Assessment & Plan  Likely multifactorial secondary to metastatic cancer and poor nutrition.  Monitor hemoglobin. I will order a follow-up CBC.  Transfuse for a hemoglobin of less than or equal to 7      Leukocytosis- (present on admission)  Assessment & Plan  Likely reactive secondary to metastatic cancer and dehydration  No focal sign of infection at this point   Continue to montior off antibiotics for now   I will order a follow-up CBC    ACP (advance care planning)- (present on admission)  Assessment & Plan  I had a discussion with the patient and family [ and daughter present at bedside in the emergency room] regarding goals of care, diagnoses, prognosis, and CODE STATUS. We discussed her prognosis and comorbidities.  The patient has advanced age of 84 years.  She has relatively  few comorbid conditions until recently.  She is presenting with multiple acute medical problems including neoplastic changes in the gallbladder and liver highly concerning for metastatic malignancy with surrounding lymphadenopathy.  In addition she has severe dehydration with multiple electrolyte abnormalities.  At this point the patient wants to proceed with a full code.  She is open to all forms of invasive and invasive diagnostic and therapeutic interventions.        VTE prophylaxis: SCDs/TEDs    I had a discussion with the patient and family [ and daughter present at bedside in the emergency room] regarding goals of care, diagnoses, prognosis, and CODE STATUS. We discussed her prognosis and comorbidities.  The patient has advanced age of 84 years.  She has relatively few comorbid conditions until recently.  She is presenting with multiple acute medical problems including neoplastic changes in the gallbladder and liver highly concerning for metastatic malignancy with surrounding lymphadenopathy.  In addition she has severe dehydration with multiple electrolyte abnormalities.  At this point the patient wants to proceed with a full code.  She is open to all forms of invasive and invasive diagnostic and therapeutic interventions. I spent 19 minutes on advanced care planning

## 2024-08-29 NOTE — ED NOTES
Pt ambulatory to ER room 2 with a slow gait and assistance from her daughter.  Pt in a gown.  Warm blanket provided.  Pt placed on monitor.  Pt rates her RUQ abdominal pain 5/10.  PIV established.  Labs drawn.  Awaiting ERP evaluation.

## 2024-08-29 NOTE — ED PROVIDER NOTES
ED Provider Note    CHIEF COMPLAINT  Chief Complaint   Patient presents with    Abdominal Pain       EXTERNAL RECORDS REVIEWED  Outpatient Notes patient was seen [facility] 8/7/2024 for GERD without esophagitis    HPI/ROS  LIMITATION TO HISTORY   Select: : None  OUTSIDE HISTORIAN(S):  Family patient's daughter states that she has had trouble with her appetite being decreased for months with intermittent right upper quadrant pain and that she has been become more jaundiced over the last month.  She is on medication for acid reflux.    Kadie Ashford is a 84 y.o. female who presents complaining of epigastric and right upper quadrant abdominal pain that has been ongoing for the last 6 months.  Her daughter has noticed that she has getting a more yellow tinge to her skin over the last month.  She states that she had blood work with her primary care physician in June which was unremarkable.  She has been placed on Prilosec acid reflux and hydroxyzine for anxiety.  The patient has had a 20 pound weight loss since January.  She does have sweating at night.  She denies any smoking drinking or drug use.  She has all of her internal organs.  She did have abdominal surgery in her 20s for a perforated ulcer.  She denies any dysuria frequency urgency fevers or chills cough or cold symptoms.  She does have intermittent shortness of breath.    PAST MEDICAL HISTORY       SURGICAL HISTORY   has a past surgical history that includes appendectomy; hysterectomy laparoscopy; and other.    FAMILY HISTORY  History reviewed. No pertinent family history.    SOCIAL HISTORY  Social History     Tobacco Use    Smoking status: Never    Smokeless tobacco: Never    Tobacco comments:     quit in 1976   Vaping Use    Vaping status: Never Used   Substance and Sexual Activity    Alcohol use: Not Currently     Comment: occ    Drug use: No    Sexual activity: Not on file       CURRENT MEDICATIONS  Home Medications       Reviewed by  "Ayaka Urias PhT (Pharmacy Tech) on 08/29/24 at 1432  Med List Status: <None>     Medication Last Dose Status   hydrOXYzine HCl (ATARAX) 25 MG Tab 8/29/2024 Active   ibuprofen (MOTRIN) 200 MG Tab few days ago Active   omeprazole (PRILOSEC) 20 MG delayed-release capsule 8/29/2024 Active   ondansetron (ZOFRAN ODT) 4 MG TABLET DISPERSIBLE a couple weeks ago Active   Polyethyl Glycol-Propyl Glycol (LUBRICATING EYE DROPS OP) 8/28/2024 Active   vitamin D3 (CHOLECALCIFEROL) 1000 Unit (25 mcg) Tab 8/28/2024 Active                  Audit from Redirected Encounters    **Home medications have not yet been reviewed for this encounter**         ALLERGIES  Allergies   Allergen Reactions    Food      Allergic to Scallops.    Nkda [No Known Drug Allergy]     Sulfa Drugs        PHYSICAL EXAM  VITAL SIGNS: /72   Pulse 89   Temp 37.1 °C (98.7 °F) (Temporal)   Resp 18   Ht 1.626 m (5' 4\")   Wt 52.6 kg (116 lb)   SpO2 92%   BMI 19.91 kg/m²      Constitutional: Well developed, Well nourished, No acute distress, Non-toxic appearance.   HEENT: Normocephalic, Atraumatic,  external ears normal, pharynx pink,  Mucous  Membranes moist, No rhinorrhea or mucosal edema  Eyes: PERRL, EOMI, Conjunctiva normal, No discharge.   Neck: Normal range of motion, No tenderness, Supple, No stridor.   Lymphatic: No lymphadenopathy    Cardiovascular: Regular Rate and Rhythm, No murmurs,  rubs, or gallops.   Thorax & Lungs: Lungs clear to auscultation bilaterally, No respiratory distress, No wheezes, rhales or rhonchi, No chest wall tenderness.   Abdomen: Bowel sounds normal, Soft, non tender, non distended,  No pulsatile masses., no rebound guarding or peritoneal signs.   Skin: Warm, Dry, No erythema, No rash,   Back:  No CVA tenderness,  No spinal tenderness, bony crepitance step offs or instability.   Extremities: Equal, intact distal pulses, No cyanosis, clubbing or edema,  No tenderness.   Musculoskeletal: Good range of motion in all " major joints. No tenderness to palpation or major deformities noted.   Neurologic: Alert & oriented No focal deficits noted.  Psychiatric: Affect normal, Judgment normal, Mood normal.      EKG/LABS  Results for orders placed or performed during the hospital encounter of 08/29/24   CBC with Differential   Result Value Ref Range    WBC 12.1 (H) 4.8 - 10.8 K/uL    RBC 4.02 (L) 4.20 - 5.40 M/uL    Hemoglobin 10.5 (L) 12.0 - 16.0 g/dL    Hematocrit 32.6 (L) 37.0 - 47.0 %    MCV 81.1 (L) 81.4 - 97.8 fL    MCH 26.1 (L) 27.0 - 33.0 pg    MCHC 32.2 32.2 - 35.5 g/dL    RDW 56.0 (H) 35.9 - 50.0 fL    Platelet Count 500 (H) 164 - 446 K/uL    MPV 9.2 9.0 - 12.9 fL    Neutrophils-Polys 75.10 (H) 44.00 - 72.00 %    Lymphocytes 11.10 (L) 22.00 - 41.00 %    Monocytes 12.40 0.00 - 13.40 %    Eosinophils 0.20 0.00 - 6.90 %    Basophils 0.40 0.00 - 1.80 %    Immature Granulocytes 0.80 0.00 - 0.90 %    Nucleated RBC 0.00 0.00 - 0.20 /100 WBC    Neutrophils (Absolute) 9.05 (H) 1.82 - 7.42 K/uL    Lymphs (Absolute) 1.34 1.00 - 4.80 K/uL    Monos (Absolute) 1.49 (H) 0.00 - 0.85 K/uL    Eos (Absolute) 0.03 0.00 - 0.51 K/uL    Baso (Absolute) 0.05 0.00 - 0.12 K/uL    Immature Granulocytes (abs) 0.10 0.00 - 0.11 K/uL    NRBC (Absolute) 0.00 K/uL   Complete Metabolic Panel   Result Value Ref Range    Sodium 134 (L) 135 - 145 mmol/L    Potassium 3.5 (L) 3.6 - 5.5 mmol/L    Chloride 95 (L) 96 - 112 mmol/L    Co2 24 20 - 33 mmol/L    Anion Gap 15.0 7.0 - 16.0    Glucose 145 (H) 65 - 99 mg/dL    Bun 11 8 - 22 mg/dL    Creatinine 0.60 0.50 - 1.40 mg/dL    Calcium 9.4 8.4 - 10.2 mg/dL    Correct Calcium 10.0 8.5 - 10.5 mg/dL    AST(SGOT) 114 (H) 12 - 45 U/L    ALT(SGPT) 102 (H) 2 - 50 U/L    Alkaline Phosphatase 536 (H) 30 - 99 U/L    Total Bilirubin 4.5 (H) 0.1 - 1.5 mg/dL    Albumin 3.3 3.2 - 4.9 g/dL    Total Protein 7.0 6.0 - 8.2 g/dL    Globulin 3.7 (H) 1.9 - 3.5 g/dL    A-G Ratio 0.9 g/dL   Lipase   Result Value Ref Range    Lipase 33 11 - 82  U/L   PT/INR   Result Value Ref Range    PT 14.6 12.0 - 14.6 sec    INR 1.09 0.87 - 1.13   PTT   Result Value Ref Range    APTT 28.2 24.7 - 36.0 sec   ESTIMATED GFR   Result Value Ref Range    GFR (CKD-EPI) 88 >60 mL/min/1.73 m 2       I have independently interpreted this EKG    RADIOLOGY/PROCEDURES   I have independently interpreted the diagnostic imaging associated with this visit and am waiting the final reading from the radiologist.   My preliminary interpretation is as follows: Chest x-ray left basilar atelectasis no pleural effusion    Radiologist interpretation:  CT-ABDOMEN-PELVIS WITH   Final Result         1. There is extensive neoplastic involvement of the liver and gallbladder.   2. Intrahepatic biliary ductal dilatation.   3. Upper abdominal lymphadenopathy.   4. Atherosclerosis including coronary artery disease.      DX-CHEST-PORTABLE (1 VIEW)   Final Result      Mild left basilar atelectasis versus infiltrate.          COURSE & MEDICAL DECISION MAKING    ASSESSMENT, COURSE AND PLAN  Care Narrative: Kadie Ashford is a 84 y.o. female who presents complaining of epigastric and right upper quadrant abdominal pain that has been ongoing for the last 6 months.  Her daughter has noticed that she has getting a more yellow tinge to her skin over the last month.  She states that she had blood work with her primary care physician in June which was unremarkable.  She has been placed on Prilosec acid reflux and hydroxyzine for anxiety.  The patient has had a 20 pound weight loss since January.  She does have sweating at night.  She denies any smoking drinking or drug use.  She has all of her internal organs.  She did have abdominal surgery in her 20s for a perforated ulcer.  She denies any dysuria frequency urgency fevers or chills cough or cold symptoms.  She does have intermittent shortness of breath.  On physical exam she is jaundice and has abdominal distention no tenderness to palpation no rebound masses  or peritoneal signs she has trace edema bilateral lower extremities.  Lungs are clear to auscultation bilaterally heart is regular rhythm no murmurs or gallops              ADDITIONAL PROBLEMS MANAGED  GERD    DISPOSITION AND DISCUSSIONS    An IV was started and lab work was drawn I ordered a CT abdomen pelvis to further evaluate her complaints as I am very concerned she may have cancer.  Patient's white blood cell count is slightly elevated at 12.1 hemoglobin slightly low at 10.5 platelet count 500 with 75% neutrophils 1.49 monocytes.  Comprehensive metabolic panel is a slight low sodium of 134 potassium slightly low at 3.5.  Anion gap is normal at 15 glucose is slightly elevated at 145.  The patient's kidney function is normal her liver function tests are all elevated with an AST of 114 ALT of 102 alkaline phosphatase of 536 and a total bilirubin of 4.5.  Lipase is normal at 33.  Coags are normal.  Chest x-ray shows mild left basilar atelectasis.  CT of the abdomen pelvis shows extensive neoplastic involvement of the liver and gallbladder intrahepatic ductal dilatation and upper abdominal lymphadenopathy.    I explained to the patient that she has liver and gallbladder cancer and that this is what is causing her jaundice.  I spoke with surgical oncology Dr. Hughes who recommends admission to the hospital for GI consult ultrasound biopsy and ERCP with stent placement to help her jaundice resolved.  He will see her as an outpatient if she is amenable to therapy.    Spoke with the hospitalist Dr. Peña who accepts the patient for admission.    I spoke with GI Dr. Mckay who will consult on the patient's case for ultrasound guided biopsy of the adenopathy and ERCP with stent placement.  She states that they do not do ultrasound-guided biopsies in the hospital but that can be done as an outpatient but they can perform the ERCP and stent placement due to her jaundice.    I have discussed management of the patient  with the following physicians and BESS's:  hospitalist Dr Peña  Surgical oncology Dr Saul Mckay     Discussion of management with other [facility] or appropriate source(s): None     Escalation of care considered, and ultimately not performed:none    Barriers to care at this time, including but not limited to:none    Decision tools and prescription drugs considered including, but not limited to: none.    CRITICAL CARE  The very real possibilty of a deterioration of this patient's condition required the highest level of my preparedness for sudden, emergent intervention.  I provided critical care services, which included medication orders, frequent reevaluations of the patient's condition and response to treatment, ordering and reviewing test results, and discussing the case with various consultants.  The critical care time associated with the care of the patient was 45 minutes. Review chart for interventions. This time is exclusive of any other billable procedures.     FINAL DIAGNOSIS  1. Liver masses    2. Jaundice         Electronically signed by: Sadie Fuentes M.D., 8/29/2024 2:08 PM

## 2024-08-30 PROBLEM — Z71.89 ADVANCE CARE PLANNING: Status: ACTIVE | Noted: 2024-08-30

## 2024-08-30 PROBLEM — R73.9 HYPERGLYCEMIA: Status: ACTIVE | Noted: 2024-08-30

## 2024-08-30 LAB
ALBUMIN SERPL BCP-MCNC: 2.7 G/DL (ref 3.2–4.9)
ALBUMIN/GLOB SERPL: 0.8 G/DL
ALP SERPL-CCNC: 483 U/L (ref 30–99)
ALT SERPL-CCNC: 90 U/L (ref 2–50)
ANION GAP SERPL CALC-SCNC: 14 MMOL/L (ref 7–16)
AST SERPL-CCNC: 106 U/L (ref 12–45)
BILIRUB CONJ SERPL-MCNC: 3.9 MG/DL (ref 0.1–0.5)
BILIRUB INDIRECT SERPL-MCNC: 0.9 MG/DL (ref 0–1)
BILIRUB SERPL-MCNC: 4.8 MG/DL (ref 0.1–1.5)
BUN SERPL-MCNC: 9 MG/DL (ref 8–22)
CALCIUM ALBUM COR SERPL-MCNC: 9.8 MG/DL (ref 8.5–10.5)
CALCIUM SERPL-MCNC: 8.8 MG/DL (ref 8.4–10.2)
CEA SERPL-MCNC: 104 NG/ML (ref 0–3)
CHLORIDE SERPL-SCNC: 96 MMOL/L (ref 96–112)
CO2 SERPL-SCNC: 23 MMOL/L (ref 20–33)
CREAT SERPL-MCNC: 0.48 MG/DL (ref 0.5–1.4)
ERYTHROCYTE [DISTWIDTH] IN BLOOD BY AUTOMATED COUNT: 55.9 FL (ref 35.9–50)
EST. AVERAGE GLUCOSE BLD GHB EST-MCNC: 94 MG/DL
GFR SERPLBLD CREATININE-BSD FMLA CKD-EPI: 92 ML/MIN/1.73 M 2
GLOBULIN SER CALC-MCNC: 3.6 G/DL (ref 1.9–3.5)
GLUCOSE SERPL-MCNC: 120 MG/DL (ref 65–99)
HAV IGM SERPL QL IA: NORMAL
HBA1C MFR BLD: 4.9 % (ref 4–5.6)
HBV CORE IGM SER QL: NORMAL
HBV SURFACE AG SER QL: NORMAL
HCT VFR BLD AUTO: 28.8 % (ref 37–47)
HCT VFR BLD AUTO: 29.3 % (ref 37–47)
HCV AB SER QL: NORMAL
HGB BLD-MCNC: 9.2 G/DL (ref 12–16)
HGB BLD-MCNC: 9.2 G/DL (ref 12–16)
INR PPP: 1.14 (ref 0.87–1.13)
MAGNESIUM SERPL-MCNC: 2 MG/DL (ref 1.5–2.5)
MCH RBC QN AUTO: 26.2 PG (ref 27–33)
MCHC RBC AUTO-ENTMCNC: 31.9 G/DL (ref 32.2–35.5)
MCV RBC AUTO: 82.1 FL (ref 81.4–97.8)
PLATELET # BLD AUTO: 401 K/UL (ref 164–446)
PMV BLD AUTO: 9 FL (ref 9–12.9)
POTASSIUM SERPL-SCNC: 3.8 MMOL/L (ref 3.6–5.5)
PROT SERPL-MCNC: 6.3 G/DL (ref 6–8.2)
PROTHROMBIN TIME: 15.1 SEC (ref 12–14.6)
RBC # BLD AUTO: 3.51 M/UL (ref 4.2–5.4)
SODIUM SERPL-SCNC: 133 MMOL/L (ref 135–145)
WBC # BLD AUTO: 10.9 K/UL (ref 4.8–10.8)

## 2024-08-30 PROCEDURE — A9270 NON-COVERED ITEM OR SERVICE: HCPCS | Performed by: HOSPITALIST

## 2024-08-30 PROCEDURE — 85610 PROTHROMBIN TIME: CPT

## 2024-08-30 PROCEDURE — 82378 CARCINOEMBRYONIC ANTIGEN: CPT

## 2024-08-30 PROCEDURE — 97535 SELF CARE MNGMENT TRAINING: CPT

## 2024-08-30 PROCEDURE — 97165 OT EVAL LOW COMPLEX 30 MIN: CPT

## 2024-08-30 PROCEDURE — 700111 HCHG RX REV CODE 636 W/ 250 OVERRIDE (IP): Mod: JZ | Performed by: HOSPITALIST

## 2024-08-30 PROCEDURE — 700102 HCHG RX REV CODE 250 W/ 637 OVERRIDE(OP): Performed by: HOSPITALIST

## 2024-08-30 PROCEDURE — 85018 HEMOGLOBIN: CPT

## 2024-08-30 PROCEDURE — 85027 COMPLETE CBC AUTOMATED: CPT

## 2024-08-30 PROCEDURE — 82105 ALPHA-FETOPROTEIN SERUM: CPT

## 2024-08-30 PROCEDURE — 80074 ACUTE HEPATITIS PANEL: CPT

## 2024-08-30 PROCEDURE — 99233 SBSQ HOSP IP/OBS HIGH 50: CPT | Mod: 25 | Performed by: INTERNAL MEDICINE

## 2024-08-30 PROCEDURE — 700105 HCHG RX REV CODE 258: Performed by: INTERNAL MEDICINE

## 2024-08-30 PROCEDURE — 36415 COLL VENOUS BLD VENIPUNCTURE: CPT

## 2024-08-30 PROCEDURE — 82248 BILIRUBIN DIRECT: CPT

## 2024-08-30 PROCEDURE — 770001 HCHG ROOM/CARE - MED/SURG/GYN PRIV*

## 2024-08-30 PROCEDURE — 94760 N-INVAS EAR/PLS OXIMETRY 1: CPT

## 2024-08-30 PROCEDURE — 83735 ASSAY OF MAGNESIUM: CPT

## 2024-08-30 PROCEDURE — 83036 HEMOGLOBIN GLYCOSYLATED A1C: CPT

## 2024-08-30 PROCEDURE — 99497 ADVNCD CARE PLAN 30 MIN: CPT | Performed by: INTERNAL MEDICINE

## 2024-08-30 PROCEDURE — 80053 COMPREHEN METABOLIC PANEL: CPT

## 2024-08-30 PROCEDURE — 85014 HEMATOCRIT: CPT

## 2024-08-30 RX ORDER — SODIUM CHLORIDE 9 MG/ML
INJECTION, SOLUTION INTRAVENOUS CONTINUOUS
Status: DISCONTINUED | OUTPATIENT
Start: 2024-08-30 | End: 2024-09-01 | Stop reason: HOSPADM

## 2024-08-30 RX ADMIN — OXYCODONE HYDROCHLORIDE 2.5 MG: 5 TABLET ORAL at 22:30

## 2024-08-30 RX ADMIN — SENNOSIDES AND DOCUSATE SODIUM 2 TABLET: 50; 8.6 TABLET ORAL at 17:38

## 2024-08-30 RX ADMIN — SODIUM CHLORIDE: 9 INJECTION, SOLUTION INTRAVENOUS at 17:37

## 2024-08-30 RX ADMIN — SODIUM CHLORIDE: 9 INJECTION, SOLUTION INTRAVENOUS at 22:24

## 2024-08-30 RX ADMIN — ONDANSETRON 4 MG: 2 INJECTION INTRAMUSCULAR; INTRAVENOUS at 12:33

## 2024-08-30 RX ADMIN — ONDANSETRON 4 MG: 2 INJECTION INTRAMUSCULAR; INTRAVENOUS at 22:33

## 2024-08-30 RX ADMIN — SODIUM CHLORIDE: 9 INJECTION, SOLUTION INTRAVENOUS at 05:27

## 2024-08-30 ASSESSMENT — ENCOUNTER SYMPTOMS
HEARTBURN: 0
DIZZINESS: 0
WEIGHT LOSS: 1
ABDOMINAL PAIN: 1
SHORTNESS OF BREATH: 0
CHILLS: 0
FEVER: 0
HEADACHES: 0
DOUBLE VISION: 0
BLURRED VISION: 0
FALLS: 0
PALPITATIONS: 0
NERVOUS/ANXIOUS: 0
VOMITING: 0
BACK PAIN: 0
NAUSEA: 1
COUGH: 0

## 2024-08-30 ASSESSMENT — COGNITIVE AND FUNCTIONAL STATUS - GENERAL
DAILY ACTIVITIY SCORE: 23
SUGGESTED CMS G CODE MODIFIER DAILY ACTIVITY: CI
HELP NEEDED FOR BATHING: A LITTLE

## 2024-08-30 ASSESSMENT — ACTIVITIES OF DAILY LIVING (ADL): TOILETING: INDEPENDENT

## 2024-08-30 ASSESSMENT — PAIN DESCRIPTION - PAIN TYPE
TYPE: ACUTE PAIN

## 2024-08-30 ASSESSMENT — LIFESTYLE VARIABLES: SUBSTANCE_ABUSE: 0

## 2024-08-30 NOTE — PROGRESS NOTES
Assumed care of pt at 1915, bedside report with JAMES Lew. Pt is AAOx 4, resting comfortably in bed with NADN, pain currently 2/10. Family is at bedside.    Pt using call light appropriately and to get up with assistance. Discussed plan of care for the night with patient, bed in lowest position, call light in reach, personal belongings in reach. No complaints at this time.

## 2024-08-30 NOTE — ASSESSMENT & PLAN NOTE
Likely reactive secondary to metastatic cancer and dehydration  No focal sign of infection at this point   Continue to montior off antibiotics for now   I will order a follow-up CBC    8/31: White blood cell count seems to be improving without antibiotics.  Continue off antibiotics for now.  Patient remains afebrile.

## 2024-08-30 NOTE — PROGRESS NOTES
Received patient from ED. Alert and oriented x4. Reports 5 out of 10 abdominal pain, managed per MAR. Ambulated to bathroom then to bed hand held without issues. Collected urine sample and sent to lab. Discussed plan of care and understood. Safety and falls precaution in place. Call light placed within reach.

## 2024-08-30 NOTE — CARE PLAN
The patient is Stable - Low risk of patient condition declining or worsening    Shift Goals  Clinical Goals: pain  control 3/10 or less; collect urine sample  Patient Goals: pain control 3/10 or less  Family Goals: get better    Progress made toward(s) clinical / shift goals:  pain controlled 3/10 or less    Patient is not progressing towards the following goals:

## 2024-08-30 NOTE — PROGRESS NOTES
Gastroenterology progess Note     Date of Consult: 8/29/2023    Requesting Physician: Sadie Fuentes M.D.      Reason for consult: Weight loss, abnormal imaging, jaundice      History of Present Illness: This is an 84-year-old female with history of perforated peptic ulcer disease s/p vagotomy and pyloroplasty who presented with unintentional weight loss, right upper quadrant abdominal pain and jaundice.  She is hard of hearing and her daughter was present at bedside who provided medical history.  Patient has been slowly losing weight for almost 1 year but she had significant weight loss since lost approximately 20 lbs since 1/2024.  She also has right upper quad abdominal pain and jaundice for the last several days.  She had labs done several months ago which revealed a normal total bilirubin and mildly elevated AST.  She had a colonoscopy for colon cancer screening at the age of 59.  Patient denies a history of liver disease, including cirrhosis, fatty liver and viral hepatitis.  She has no history of IV drug use.    In the ED, patient was found to have hyperbilirubinemia 4.5, elevated alkaline phosphatase 536, , .  CT abdomen/pelvis revealed heterogeneity with confluent masses involving the right hepatic lobe and hepatomegaly measuring 20.2 cm.  There is a 3.8 x 3.2 cm mass within the gallbladder can fluid with hepatic abnormality.  It is unclear if this represents a hepatic neoplasm invading the gallbladder or gallbladder neoplasm invading the liver.  There are upper abdominal lymphadenopathy at the gastrohepatic ligament, heidy hepatis and portacaval regions measuring 3 to 4 cm.  The lymph nodes compress the portal vein and probably the common bile duct and common hepatic duct.  Impression that there is extensive neoplastic involvement the liver and gallbladder with intrahepatic biliary ductal dilation and upper abdominal  lymphadenopathy.      8/30/24      Patient was seen and evaluated today with the daughter who was at bedside  I had a very extensive discussion with her and the patient regarding ERCP biliary stent placement concerns for mass being of neoplastic origin and success rates and other possible therapeutic measures such as IR placing external biliary drain and then perhaps at some point performing a rendezvous procedure.    At the present time the patient is comfortable little fatigued some nausea but denies any significant abdominal pain shortness of breath chest pain dysphagia odynophagia hematemesis coffee-ground's bowel abnormalities.     Past Medical History:  History reviewed. No pertinent past medical history.     Past Surgical History:   Past Surgical History:   Procedure Laterality Date    APPENDECTOMY      HYSTERECTOMY LAPAROSCOPY      OTHER      Nose SX in 1959        Allergies  Food, Nkda [no known drug allergy], and Sulfa drugs     Social History:   Social History     Socioeconomic History    Marital status:      Spouse name: Not on file    Number of children: Not on file    Years of education: Not on file    Highest education level: Not on file   Occupational History    Not on file   Tobacco Use    Smoking status: Never    Smokeless tobacco: Never    Tobacco comments:     quit in 1976   Vaping Use    Vaping status: Never Used   Substance and Sexual Activity    Alcohol use: Not Currently     Comment: occ    Drug use: No    Sexual activity: Not on file   Other Topics Concern    Not on file   Social History Narrative    Not on file     Social Determinants of Health     Financial Resource Strain: Not on file   Food Insecurity: No Food Insecurity (8/29/2024)    Hunger Vital Sign     Worried About Running Out of Food in the Last Year: Never true     Ran Out of Food in the Last Year: Never true   Transportation Needs: No Transportation Needs (8/29/2024)    PRAPARE - Transportation     Lack of Transportation  (Medical): No     Lack of Transportation (Non-Medical): No   Physical Activity: Not on file   Stress: Not on file   Social Connections: Not on file   Intimate Partner Violence: Not At Risk (8/29/2024)    Humiliation, Afraid, Rape, and Kick questionnaire     Fear of Current or Ex-Partner: No     Emotionally Abused: No     Physically Abused: No     Sexually Abused: No   Housing Stability: Low Risk  (8/29/2024)    Housing Stability Vital Sign     Unable to Pay for Housing in the Last Year: No     Number of Times Moved in the Last Year: 1     Homeless in the Last Year: No        Family History:   History reviewed. No pertinent family history.    Home Medications:  Home Medications    Medication Sig Taking? Last Dose Authorizing Provider   ondansetron (ZOFRAN ODT) 4 MG TABLET DISPERSIBLE Take 4 mg by mouth every 6 hours as needed for Nausea/Vomiting. Yes a couple weeks ago at [facility]   hydrOXYzine HCl (ATARAX) 25 MG Tab Take 1 Tablet by mouth 2 times a day as needed. Yes 8/29/2024 at [facility]   omeprazole (PRILOSEC) 20 MG delayed-release capsule Take 20 mg by mouth every day. Yes 8/29/2024 at am [facility]   vitamin D3 (CHOLECALCIFEROL) 1000 Unit (25 mcg) Tab Take 2,000 Units by mouth every day. Yes 8/28/2024 at am [facility]   ibuprofen (MOTRIN) 200 MG Tab Take 200 mg by mouth every 6 hours as needed for Mild Pain. Yes few days ago at [facility]   Polyethyl Glycol-Propyl Glycol (LUBRICATING EYE DROPS OP) Administer 1 Drop into affected eye(s) 1 time a day as needed (dry eyes). Yes 8/28/2024 at am [facility]         Review of Systems:  General: No fevers, chills. Positive weight loss.  HEENT: Positive scleral icterus. No gum bleed, dysphagia, odynophagia.  Cardiology: No chest pain, palpitations, orthopnea.  Respiratory: No dyspnea, cough, wheezing.  Gastrointestinal: Positive abdominal pain. No abdominal pain, nausea, vomiting, changes in  "bowel habits, rectal bleed.  Genitourinary: No hematuria, dysuria, urgency.  Neurologic: No changes in memory, confusion, gait instability.  Skin: Positive jaundice. No ecchymosis, telangiectasia.    Physical Exam:  Vitals:    08/29/24 1736 08/29/24 2139 08/30/24 0448 08/30/24 0940   BP: (!) 171/76 (!) 143/61 123/59 135/65   Pulse: 88 87 85 77   Resp: 18 18 18 18   Temp: 36.1 °C (97 °F) 36.8 °C (98.3 °F) 36.9 °C (98.4 °F) 36.3 °C (97.3 °F)   TempSrc: Temporal Temporal Oral Temporal   SpO2: 100% 93% 95% 95%   Weight: 50.8 kg (111 lb 15.9 oz)      Height: 1.626 m (5' 4\")        General: No acute cardiopulmonary distress.  Head: Normocephalic.  EENT: Mild scleral nicterus. Mucosa moist.   Respiratory: Breath sounds present. Symmetrical rise of anterior thorax.  Cardiovascular: Normal S1, S2.  Gastrointestinal: Soft, nontender, nondistended. Mid abdominal surgical scar. Normoactive bowel sounds. No guarding.  Neurologic: Alert and oriented.  Skin: Warm and dry.      LABS:  Lab Results   Component Value Date/Time    SODIUM 133 (L) 08/30/2024 02:45 AM    POTASSIUM 3.8 08/30/2024 02:45 AM    CHLORIDE 96 08/30/2024 02:45 AM    CO2 23 08/30/2024 02:45 AM    GLUCOSE 120 (H) 08/30/2024 02:45 AM    BUN 9 08/30/2024 02:45 AM    CREATININE 0.48 (L) 08/30/2024 02:45 AM      Lab Results   Component Value Date/Time    WBC 10.9 (H) 08/30/2024 02:45 AM    RBC 3.51 (L) 08/30/2024 02:45 AM    HEMOGLOBIN 9.2 (L) 08/30/2024 08:22 AM    HEMATOCRIT 29.3 (L) 08/30/2024 08:22 AM    MCV 82.1 08/30/2024 02:45 AM    MCH 26.2 (L) 08/30/2024 02:45 AM    MCHC 31.9 (L) 08/30/2024 02:45 AM    MPV 9.0 08/30/2024 02:45 AM    NEUTSPOLYS 75.10 (H) 08/29/2024 02:05 PM    LYMPHOCYTES 11.10 (L) 08/29/2024 02:05 PM    MONOCYTES 12.40 08/29/2024 02:05 PM    EOSINOPHILS 0.20 08/29/2024 02:05 PM    BASOPHILS 0.40 08/29/2024 02:05 PM        Lab Results   Component Value Date/Time    PROTHROMBTM 15.1 (H) 08/30/2024 02:45 AM    INR 1.14 (H) 08/30/2024 02:45 AM    "   Recent Labs     08/29/24  1405 08/30/24  0245   ASTSGOT 114* 106*   ALTSGPT 102* 90*   TBILIRUBIN 4.5* 4.8*   IBILIRUBIN  --  0.9   DBILIRUBIN  --  3.9*   GLOBULIN 3.7* 3.6*   INR 1.09 1.14*       IMAGING: Reviewed personally and summarized in HPI.        ASSESSMENT:   -Obstructive jaundice  -Metastatic disease involving the liver and gallbladder  -Right upper abdominal pain with unintentional weight loss  -History of perforated peptic ulcer disease s/p pyloroplasty and vagotomy       PLAN:   This is an 84-year-old female who presented with unintentional weight loss, RUQ pain and jaundice.  CT imaging revealed extensive metastatic disease involving the liver and gallbladder with intrahepatic biliary ductal dilation and upper abdominal lymphadenopathy.      Recommend an ERCP with biliary stent placement for biliary ductal decompression.  Tomorrow in the morning.      NPO at midnight in case there is an opening tomorrow.          Risks, benefits and alternatives of the procedure were discussed with the patient, including but not limited to pancreatitis, bowel perforation, pain and anesthesia side effects.  She verbalized understanding and agreed to proceed.  All questions and concerns addressed.  She will also need an EUS with FNA as an outpatient.      Check AFP and viral hepatitis panel.    Thank you for the consultation. Please call with any questions or concerns.      Kobi Orourke M.D.  Gastroenterology  [facility]  [phone]

## 2024-08-30 NOTE — PROGRESS NOTES
Received patient from Night shift RN . Patient is awake and alert.No sign of distress. Patient denies any nausea.Reports mild pain to RUQ abdomen. Patient has generalize jaundice. Updated patient on Strict NPO .  Fall precaution in place, kept bed in lowest position and call light within reach.

## 2024-08-30 NOTE — PROGRESS NOTES
"Hospital Medicine Daily Progress Note    Date of Service  8/30/2024    Chief Complaint  Kadie Ashford is a 84 y.o. female admitted 8/29/2024 with abdominal pain.    Hospital Course  As per chart review:  \"84 y.o. female with no known significant past medical history who presented 8/29/2024 with right upper quadrant, jaundice abdominal pain, generalized weakness and anorexia.  The patient has been having progressively worsening generalized weakness, easy fatigability and jaundice over the past few months.  The pain was initially mild but now is moderate.  She has noticed dark-colored urine and intermittent episodes of light-colored stools.  She denies having itching.  She denies having fevers or chills.\"    Interval Problem Update  8/30: Patient seen at bedside this morning.  Patient awaiting GI evaluation for possible intervention.  We appreciate further recommendations.  Patient currently n.p.o.  Patient currently not complaining of overt pain, however she still has some slight discomfort in her abdomen.    I have discussed this patient's plan of care and discharge plan at IDT rounds today with Case Management, Nursing, Nursing leadership, and other members of the IDT team.    Consultants/Specialty  GI    Code Status  Full Code    Disposition  The patient is not medically cleared for discharge to home or a post-acute facility.        Review of Systems  Review of Systems   Constitutional:  Positive for malaise/fatigue and weight loss. Negative for chills and fever.   HENT:  Negative for hearing loss and nosebleeds.    Eyes:  Negative for blurred vision and double vision.   Respiratory:  Negative for cough and shortness of breath.    Cardiovascular:  Negative for chest pain and palpitations.   Gastrointestinal:  Positive for abdominal pain and nausea. Negative for heartburn and vomiting.   Genitourinary:  Negative for dysuria and urgency.   Musculoskeletal:  Negative for back pain and falls.   Skin:  Negative for " itching and rash.   Neurological:  Negative for dizziness and headaches.   Psychiatric/Behavioral:  Negative for substance abuse. The patient is not nervous/anxious.    All other systems reviewed and are negative.       Physical Exam  Temp:  [36.1 °C (97 °F)-37.1 °C (98.7 °F)] 36.3 °C (97.3 °F)  Pulse:  [77-89] 77  Resp:  [18] 18  BP: (123-176)/() 135/65  SpO2:  [92 %-100 %] 95 %    Physical Exam  Vitals and nursing note reviewed.   Constitutional:       General: She is not in acute distress.     Appearance: She is ill-appearing.   HENT:      Head: Normocephalic and atraumatic.      Right Ear: External ear normal.      Left Ear: External ear normal.      Mouth/Throat:      Pharynx: No oropharyngeal exudate or posterior oropharyngeal erythema.   Eyes:      General:         Right eye: No discharge.         Left eye: No discharge.   Cardiovascular:      Rate and Rhythm: Normal rate and regular rhythm.      Pulses: Normal pulses.      Heart sounds: No murmur heard.     No gallop.   Pulmonary:      Effort: Pulmonary effort is normal. No respiratory distress.      Breath sounds: Normal breath sounds. No wheezing or rhonchi.   Abdominal:      General: Bowel sounds are normal. There is no distension.      Palpations: Abdomen is soft.      Tenderness: There is abdominal tenderness. There is no guarding.   Musculoskeletal:         General: No swelling or tenderness. Normal range of motion.      Cervical back: Normal range of motion and neck supple. No tenderness.   Skin:     General: Skin is warm and dry.      Coloration: Skin is jaundiced.   Neurological:      General: No focal deficit present.      Mental Status: She is alert and oriented to person, place, and time. Mental status is at baseline.      Motor: No weakness.   Psychiatric:         Mood and Affect: Mood normal.         Behavior: Behavior normal.         Fluids    Intake/Output Summary (Last 24 hours) at 8/30/2024 1125  Last data filed at 8/30/2024  0755  Gross per 24 hour   Intake 240 ml   Output 1100 ml   Net -860 ml        Laboratory  Recent Labs     08/29/24  1405 08/30/24  0245 08/30/24  0822   WBC 12.1* 10.9*  --    RBC 4.02* 3.51*  --    HEMOGLOBIN 10.5* 9.2* 9.2*   HEMATOCRIT 32.6* 28.8* 29.3*   MCV 81.1* 82.1  --    MCH 26.1* 26.2*  --    MCHC 32.2 31.9*  --    RDW 56.0* 55.9*  --    PLATELETCT 500* 401  --    MPV 9.2 9.0  --      Recent Labs     08/29/24  1405 08/30/24  0245   SODIUM 134* 133*   POTASSIUM 3.5* 3.8   CHLORIDE 95* 96   CO2 24 23   GLUCOSE 145* 120*   BUN 11 9   CREATININE 0.60 0.48*   CALCIUM 9.4 8.8     Recent Labs     08/29/24  1405 08/30/24  0245   APTT 28.2  --    INR 1.09 1.14*               Imaging  CT-ABDOMEN-PELVIS WITH   Final Result         1. There is extensive neoplastic involvement of the liver and gallbladder.   2. Intrahepatic biliary ductal dilatation.   3. Upper abdominal lymphadenopathy.   4. Atherosclerosis including coronary artery disease.      DX-CHEST-PORTABLE (1 VIEW)   Final Result      Mild left basilar atelectasis versus infiltrate.           Assessment/Plan  * Obstructive jaundice- (present on admission)  Assessment & Plan  Presenting with progressively worsening right upper quadrant abdominal pain and jaundice  Likely multifactorial secondary to metastatic versus primary malignancy and biliary obstruction.  GI consulted to consider stent placement and endoscopic ultrasound.  Avoid hepatotoxins as much as possible.  I will order follow-up [facility]     8/30: Patient being followed by GI, we appreciate further recommendations.  The plan is to do an ERCP, however it is unclear if it will be done today or tomorrow.  Depending on OR availability.    Advance care planning  Assessment & Plan  8/30: I discussed with patient for at least 16 minutes further goals of care.  This included CODE STATUS which includes full code and DNR/DNI.  The patient would like to be full code.  We also discussed further treatment goals.  For  now the patient would like to have full treatment options.  This includes invasive or noninvasive procedures as needed.  In the event that the patient cannot make decisions for herself she would like her  [place] to make decisions for her.    Hyperglycemia  Assessment & Plan  Pending A1c  monitor    Abnormal CT scan showing extensive neoplastic involvement of the liver and gallbladder- (present on admission)  Assessment & Plan  Abnormal CT scan showing extensive neoplastic involvement of the liver and gallbladder   In addition there are multiple enlarged lymph nodes.  Case was discussed with surgical oncology, Dr. Llamas who recommended GI consultation to consider stent placement and endoscopic ultrasound for biopsy.  GI, Dr. Mckay consulted  The patient will need to follow-up with surgical oncology    8/30: Patient being followed by GI, we appreciate further recommendations.  The plan is to do an ERCP, however it is unclear if it will be done today or tomorrow.  Depending on OR availability.    Severe protein-calorie malnutrition (HCC)- (present on admission)  Assessment & Plan  Patient has been having progressively worsening anorexia with reduced oral intake over the past 1 month  The patient has temporal muscle wasting.  The patient has thenar and hypothenar wasting.   I will start on nutritional boost supplements.  Nutrition consult.  I will monitor his magnesium and phosphorus for potential refeeding.     Elevated blood pressure reading- (present on admission)  Assessment & Plan  No known history of primary hypertension  Likely secondary to severe pain  Multimodal pain control  I will start as needed labetalol for extreme hypertension  Consider starting scheduled antihypertensive medications according to blood pressure trend     Dehydration- (present on admission)  Assessment & Plan  Likely secondary to reduced oral intake   Encourage oral intake as tolerated, antiemetics as needed.  Intravenous  hydration until adequate oral intake is achieved     Hypokalemia- (present on admission)  Assessment & Plan  Replace as needed  monitor    Hyponatremia- (present on admission)  Assessment & Plan  Mild  monitor    Anemia- (present on admission)  Assessment & Plan  Likely multifactorial secondary to metastatic cancer and poor nutrition.  Monitor hemoglobin. I will order a follow-up CBC.  Transfuse for a hemoglobin of less than or equal to 7      Leukocytosis- (present on admission)  Assessment & Plan  Likely reactive secondary to metastatic cancer and dehydration  No focal sign of infection at this point   Continue to montior off antibiotics for now   I will order a follow-up CBC    8/30: White blood cell count seems to be improving without antibiotics.  Continue off antibiotics for now.  Patient remains afebrile.    Elevated liver enzymes- (present on admission)  Assessment & Plan  Likely multifactorial secondary to metastatic versus primary malignancy and biliary obstruction.  GI consulted to consider stent placement and endoscopic ultrasound.  Avoid hepatotoxins as much as possible.  I will order follow-up CMP         VTE prophylaxis: SCDs    I have performed a physical exam and reviewed and updated ROS and Plan today (8/30/2024). In review of yesterday's note (8/29/2024), there are no changes except as documented above.      I spend at least 51 minutes providing care for this patient.  This included face-to-face interview, physical examination.  Review of lab work including CBC, hemoglobin, CMP, A1c.  Discussion with multidisciplinary team including case management, nursing staff and pharmacy.  Creating plan of care, reviewing orders.    Moreover, I discussed with patient for at least 16 minutes further goals of care.  This included CODE STATUS which includes full code and DNR/DNI.  The patient would like to be full code.  We also discussed further treatment goals.  For now the patient would like to have full  treatment options.  This includes invasive or noninvasive procedures as needed.  In the event that the patient cannot make decisions for herself she would like her  [place] to make decisions for her.

## 2024-08-30 NOTE — CONSULTS
Gastroenterology Consult Note     Date of Consult: 8/29/2023    Requesting Physician: Sadie Fuentes M.D.      Reason for consult: Weight loss, abnormal imaging, jaundice      History of Present Illness: This is an 84-year-old female with history of perforated peptic ulcer disease s/p vagotomy and pyloroplasty who presented with unintentional weight loss, right upper quadrant abdominal pain and jaundice.  She is hard of hearing and her daughter was present at bedside who provided medical history.  Patient has been slowly losing weight for almost 1 year but she had significant weight loss since lost approximately 20 lbs since 1/2024.  She also has right upper quad abdominal pain and jaundice for the last several days.  She had labs done several months ago which revealed a normal total bilirubin and mildly elevated AST.  She had a colonoscopy for colon cancer screening at the age of 59.  Patient denies a history of liver disease, including cirrhosis, fatty liver and viral hepatitis.  She has no history of IV drug use.    In the ED, patient was found to have hyperbilirubinemia 4.5, elevated alkaline phosphatase 536, , .  CT abdomen/pelvis revealed heterogeneity with confluent masses involving the right hepatic lobe and hepatomegaly measuring 20.2 cm.  There is a 3.8 x 3.2 cm mass within the gallbladder can fluid with hepatic abnormality.  It is unclear if this represents a hepatic neoplasm invading the gallbladder or gallbladder neoplasm invading the liver.  There are upper abdominal lymphadenopathy at the gastrohepatic ligament, heidy hepatis and portacaval regions measuring 3 to 4 cm.  The lymph nodes compress the portal vein and probably the common bile duct and common hepatic duct.  Impression that there is extensive neoplastic involvement the liver and gallbladder with intrahepatic biliary ductal dilation and upper abdominal lymphadenopathy.       Past  Medical History:  History reviewed. No pertinent past medical history.     Past Surgical History:   Past Surgical History:   Procedure Laterality Date    APPENDECTOMY      HYSTERECTOMY LAPAROSCOPY      OTHER      Nose SX in 1959        Allergies  Food, Nkda [no known drug allergy], and Sulfa drugs     Social History:   Social History     Socioeconomic History    Marital status:      Spouse name: Not on file    Number of children: Not on file    Years of education: Not on file    Highest education level: Not on file   Occupational History    Not on file   Tobacco Use    Smoking status: Never    Smokeless tobacco: Never    Tobacco comments:     quit in 1976   Vaping Use    Vaping status: Never Used   Substance and Sexual Activity    Alcohol use: Not Currently     Comment: occ    Drug use: No    Sexual activity: Not on file   Other Topics Concern    Not on file   Social History Narrative    Not on file     Social Determinants of Health     Financial Resource Strain: Not on file   Food Insecurity: No Food Insecurity (8/29/2024)    Hunger Vital Sign     Worried About Running Out of Food in the Last Year: Never true     Ran Out of Food in the Last Year: Never true   Transportation Needs: No Transportation Needs (8/29/2024)    PRAPARE - Transportation     Lack of Transportation (Medical): No     Lack of Transportation (Non-Medical): No   Physical Activity: Not on file   Stress: Not on file   Social Connections: Not on file   Intimate Partner Violence: Not At Risk (8/29/2024)    Humiliation, Afraid, Rape, and Kick questionnaire     Fear of Current or Ex-Partner: No     Emotionally Abused: No     Physically Abused: No     Sexually Abused: No   Housing Stability: Low Risk  (8/29/2024)    Housing Stability Vital Sign     Unable to Pay for Housing in the Last Year: No     Number of Times Moved in the Last Year: 1     Homeless in the Last Year: No        Family History:   History reviewed. No pertinent family  "history.    Home Medications:  Home Medications    Medication Sig Taking? Last Dose Authorizing Provider   ondansetron (ZOFRAN ODT) 4 MG TABLET DISPERSIBLE Take 4 mg by mouth every 6 hours as needed for Nausea/Vomiting. Yes a couple weeks ago at [facility]   hydrOXYzine HCl (ATARAX) 25 MG Tab Take 1 Tablet by mouth 2 times a day as needed. Yes 8/29/2024 at [facility]   omeprazole (PRILOSEC) 20 MG delayed-release capsule Take 20 mg by mouth every day. Yes 8/29/2024 at am Physician Outpatient   vitamin D3 (CHOLECALCIFEROL) 1000 Unit (25 mcg) Tab Take 2,000 Units by mouth every day. Yes 8/28/2024 at am Physician Outpatient   ibuprofen (MOTRIN) 200 MG Tab Take 200 mg by mouth every 6 hours as needed for Mild Pain. Yes few days ago at [facility]n [facility]   Polyethyl Glycol-Propyl Glycol (LUBRICATING EYE DROPS OP) Administer 1 Drop into affected eye(s) 1 time a day as needed (dry eyes). Yes 8/28/2024 at am [facility]         Review of Systems:  General: No fevers, chills. Positive weight loss.  HEENT: Positive scleral icterus. No gum bleed, dysphagia, odynophagia.  Cardiology: No chest pain, palpitations, orthopnea.  Respiratory: No dyspnea, cough, wheezing.  Gastrointestinal: Positive abdominal pain. No abdominal pain, nausea, vomiting, changes in bowel habits, rectal bleed.  Genitourinary: No hematuria, dysuria, urgency.  Neurologic: No changes in memory, confusion, gait instability.  Skin: Positive jaundice. No ecchymosis, telangiectasia.    Physical Exam:  Vitals:    08/29/24 1533 08/29/24 1603 08/29/24 1633 08/29/24 1736   BP: (!) 148/70 (!) 176/75 (!) 148/69 (!) 171/76   Pulse: 81 85 86 88   Resp:    18   Temp:    36.1 °C (97 °F)   TempSrc:    Temporal   SpO2: 95% 95% 97% 100%   Weight:    50.8 kg (111 lb 15.9 oz)   Height:    1.626 m (5' 4\")     General: No acute cardiopulmonary distress.  Head: Normocephalic.  EENT: Mild scleral nicterus. Mucosa moist.   Respiratory: " Breath sounds present. Symmetrical rise of anterior thorax.  Cardiovascular: Normal S1, S2.  Gastrointestinal: Soft, nontender, nondistended. Mid abdominal surgical scar. Normoactive bowel sounds. No guarding.  Neurologic: Alert and oriented.  Skin: Warm and dry.      LABS:  Lab Results   Component Value Date/Time    SODIUM 134 (L) 08/29/2024 02:05 PM    POTASSIUM 3.5 (L) 08/29/2024 02:05 PM    CHLORIDE 95 (L) 08/29/2024 02:05 PM    CO2 24 08/29/2024 02:05 PM    GLUCOSE 145 (H) 08/29/2024 02:05 PM    BUN 11 08/29/2024 02:05 PM    CREATININE 0.60 08/29/2024 02:05 PM      Lab Results   Component Value Date/Time    WBC 12.1 (H) 08/29/2024 02:05 PM    RBC 4.02 (L) 08/29/2024 02:05 PM    HEMOGLOBIN 10.5 (L) 08/29/2024 02:05 PM    HEMATOCRIT 32.6 (L) 08/29/2024 02:05 PM    MCV 81.1 (L) 08/29/2024 02:05 PM    MCH 26.1 (L) 08/29/2024 02:05 PM    MCHC 32.2 08/29/2024 02:05 PM    MPV 9.2 08/29/2024 02:05 PM    NEUTSPOLYS 75.10 (H) 08/29/2024 02:05 PM    LYMPHOCYTES 11.10 (L) 08/29/2024 02:05 PM    MONOCYTES 12.40 08/29/2024 02:05 PM    EOSINOPHILS 0.20 08/29/2024 02:05 PM    BASOPHILS 0.40 08/29/2024 02:05 PM        Lab Results   Component Value Date/Time    PROTHROMBTM 14.6 08/29/2024 02:05 PM    INR 1.09 08/29/2024 02:05 PM      Recent Labs     08/29/24  1405   ASTSGOT 114*   ALTSGPT 102*   TBILIRUBIN 4.5*   GLOBULIN 3.7*   INR 1.09       IMAGING: Reviewed personally and summarized in HPI.        ASSESSMENT:   -Obstructive jaundice  -Metastatic disease involving the liver and gallbladder  -Right upper abdominal pain with unintentional weight loss  -History of perforated peptic ulcer disease s/p pyloroplasty and vagotomy       PLAN:   This is an 84-year-old female who presented with unintentional weight loss, RUQ pain and jaundice.  CT imaging revealed extensive metastatic disease involving the liver and gallbladder with intrahepatic biliary ductal dilation and upper abdominal lymphadenopathy.  Recommend an ERCP with biliary  stent placement for biliary ductal decompression.  There is no OR availability on Friday.  NPO at midnight in case there is an opening tomorrow.  If no availability, procedure will done over the weekend.  Risks, benefits and alternatives of the procedure were discussed with the patient, including but not limited to pancreatitis, bowel perforation, pain and anesthesia side effects.  She verbalized understanding and agreed to proceed.  All questions and concerns addressed.  She will also need an EUS with FNA as an outpatient.      Check AFP and viral hepatitis panel.  Dr. Orourke will take over the GI service tomorrow.  Further recommendations to follow.       Thank you for the consultation. Please call with any questions or concerns.      Laura Mckay D.O.  Gastroenterology  [facility]  [phone]

## 2024-08-30 NOTE — ASSESSMENT & PLAN NOTE
I had a discussion with the patient and family [ and daughter present at bedside in the emergency room] regarding goals of care, diagnoses, prognosis, and CODE STATUS. We discussed her prognosis and comorbidities.  The patient has advanced age of 84 years.  She has relatively few comorbid conditions until recently.  She is presenting with multiple acute medical problems including neoplastic changes in the gallbladder and liver highly concerning for metastatic malignancy with surrounding lymphadenopathy.  In addition she has severe dehydration with multiple electrolyte abnormalities.  At this point the patient wants to proceed with a full code.  She is open to all forms of invasive and invasive diagnostic and therapeutic interventions.

## 2024-08-30 NOTE — CARE PLAN
The patient is Stable - Low risk of patient condition declining or worsening    Shift Goals  Clinical Goals: Patient will report pain level 3/10 or less, no nausea, free from fall  Patient Goals: rest comfortably  Family Goals: get better    Progress made toward(s) clinical / shift goals:  Patient is reporting mild abdominal pain but tolerable, refused pain medications at this time.  Patient remained on Strict NPO.  Awaiting for ERCP , not so sure if there is OR bed available. Patient is not in the procedure list at the moment    Patient is not progressing towards the following goals:    Problem: Pain - Standard  Goal: Alleviation of pain or a reduction in pain to the patient’s comfort goal  Outcome: Progressing     Problem: Knowledge Deficit - Standard  Goal: Patient and family/care givers will demonstrate understanding of plan of care, disease process/condition, diagnostic tests and medications  Outcome: Progressing     Problem: Skin Integrity  Goal: Skin integrity is maintained or improved  Outcome: Progressing     Problem: Fall Risk  Goal: Patient will remain free from falls  Outcome: Progressing     Problem: Psychosocial  Goal: Patient's ability to verbalize feelings about condition will improve  Outcome: Progressing     Problem: Nutrition  Goal: Patient's nutritional and fluid intake will be adequate or improve  Outcome: Progressing     Problem: Gastrointestinal Irritability  Goal: Nausea and vomiting will be absent or improve  Outcome: Progressing     Problem: Mobility  Goal: Patient's capacity to carry out activities will improve  Outcome: Progressing     Problem: Self Care  Goal: Patient will have the ability to perform ADLs independently or with assistance (bathe, groom, dress, toilet and feed)  Outcome: Progressing

## 2024-08-30 NOTE — CARE PLAN
The patient is Stable - Low risk of patient condition declining or worsening    Shift Goals  Clinical Goals: pain managed <5/10, no falls, NPO at midnight,  Patient Goals: rest  Family Goals: get better    Progress made toward(s) clinical / shift goals:  pain controlled as per MAR. Pt had no falls during the night. Patient made NPO at midnight and prepared for ERCP today.    Patient is not progressing towards the following goals:

## 2024-08-30 NOTE — ASSESSMENT & PLAN NOTE
Patient has been having progressively worsening anorexia with reduced oral intake over the past 1 month  The patient has temporal muscle wasting.  The patient has thenar and hypothenar wasting.   I will start on nutritional boost supplements.  Nutrition consult.  I will monitor his magnesium and phosphorus for potential refeeding.

## 2024-08-30 NOTE — ASSESSMENT & PLAN NOTE
Abnormal CT scan showing extensive neoplastic involvement of the liver and gallbladder   In addition there are multiple enlarged lymph nodes.  Case was discussed with surgical oncology, Dr. Llamas who recommended GI consultation to consider stent placement and endoscopic ultrasound for biopsy.  GI, Dr. Mckay consulted  The patient will need to follow-up with surgical oncology    8/30: Patient being followed by GI, we appreciate further recommendations.  The plan is to do an ERCP, however it is unclear if it will be done today or tomorrow.  Depending on OR availability.    8/31: Patient to undergo ERCP today by GI, we appreciate further recommendations.

## 2024-08-30 NOTE — ASSESSMENT & PLAN NOTE
8/31: Hemoglobin this morning was 7.9, repeat was 8.2.  No signs of overt bleeding.  We have ordered iron studies.  Continue to monitor closely.

## 2024-08-30 NOTE — DIETARY
"Nutrition Services: Initial Assessment     Day 1 of admit. Kadie Ashford is a 84 y.o. female with admitting DX of Obstructive jaundice [K83.1]     Consult received for MST score >/= 2, MD diagnosis of malnutrition, and failure to thrive.     Nutrition Assessment:      Height: 162.6 cm (5' 4\")  Weight: 50.8 kg (111 lb 15.9 oz)  Weight taken via bed scale  Body mass index is 19.22 kg/m². BMI classification: Underweight based on pt's age  .  Wt Readings from Last 6 Encounters:   08/29/24 50.8 kg (111 lb 15.9 oz)   07/04/24 56.7 kg (125 lb)        Objective:  DX includes metastatic disease involving the liver and gallbladder. Plan is for ERCP with biliary stent placement for biliary ductal decompression.   Pertinent medical hx: perforated peptic ulcer disease s/p vagotomy and pyloroplasty   Pertinent labs:   Lab Results   Component Value Date/Time    SODIUM 133 (L) 08/30/2024 02:45 AM    POTASSIUM 3.8 08/30/2024 02:45 AM    CO2 23 08/30/2024 02:45 AM    CHLORIDE 96 08/30/2024 02:45 AM    BUN 9 08/30/2024 02:45 AM    CREATININE 0.48 (L) 08/30/2024 02:45 AM    CALCIUM 8.8 08/30/2024 02:45 AM    MAGNESIUM 2.0 08/30/2024 02:45 AM    GLUCOSE 120 (H) 08/30/2024 02:45 AM      Pertinent meds:   Scheduled Medications   Medication Dose Frequency    senna-docusate  2 Tablet BID      Food Allergies: scallops  Last BM: 08/28/24 per flowsheets    Current diet order:   NPO    Subjective:   Pt's dtr requested visit from RD. Dtr is concerned about her mother's lack of interest in food and poor appetite. Pt often does not listen to her dtr's recommendations and encouragement to eat more and healthier.  Pt does not like Boost supplements very much. She has them at home but she will not drink them everyday. RD encouraged her to try Premier supplements. Based on prior discussions with patients and colleagues, this supplement seems to be better received. Easy high protein food choices also discussed. H/O provided on tips to increase " kcals and protein, and high kcal/protein recipes.   Dietary recall/energy intake: daughter reports that pt has not been eating well for ~ 1 year but more recently PO dropped down to < 50% of her normal intake since April.  In April, pt had an infection and was on an abx which caused GI distress. Pt said that she limits what she eats based on how it will make her feel after eating it. She said blander foods cause the least discomfort. Her diet consists of cottage cheese and fruit for breakfast, a sandwich provided by her dtr for lunch, and a dinner. Per dtr, pt often will not eat her meals. This indicates insufficient energy intake.   Pt and dtr agreed to pt receiving Ensure Clear supplement if pt is started on clear liquids, and Magic Cups and Ensure supplements when diet is > clear liquids.     Nutrition Focused Physical Exam (NFPE)   Weight loss: Per GI's note, pt has lost 20 lb (10%) since 1/2024. Based on review of wt hx in Epic, pt with 10% wt loss since July. Wt loss is significant. RD suspects this change related to decreased intake due to GI discomfort with PO.  Muscle mass: Mild-moderate loss  Subcutaneous fat: Mild-moderate loss  Fluid Accumulation: no edema noted  Reduced  Strength: N/A in acute care setting.     Nutrition Diagnosis:      Altered GI function related to dx of obstructive jaundice as evidenced by diminished PO intake to < 50% of needs due to abdominal discomfort with PO intake.     Severe malnutrition in context of acute illness or injury related to decreased intake with obstructive jaundice as evidenced by wt loss of 10% x 2 months and report of PO < 50% of normal ~5 months.    Nutrition Interventions:      When medically feasible initiate diet.   Recommend Ensure Clear (provides 240 calories, 8 g protein per 8 fl oz) TID if pt is on clear liquids. Provide Ensure Plus and Magic Cups if pt is on full liquids or greater.  Handout on tips to increase kcals/protein and handout on high  kcal/protein recipes provided.   Patient aware of active plan of care as appropriate.     Nutrition Monitoring and Evaluation:     Monitor nutrition POC  Additional fluids per MD/DO  Monitor vital signs pertinent to nutrition       RD following and will provide updated recommendations as indicated.     Miracle Webber R.D.

## 2024-08-30 NOTE — ASSESSMENT & PLAN NOTE
Likely secondary to reduced oral intake   Encourage oral intake as tolerated, antiemetics as needed.  Intravenous hydration until adequate oral intake is achieved

## 2024-08-30 NOTE — PROGRESS NOTES
4 Eyes Skin Assessment Completed by JAMES Lew and JAMES Cosme.    Head WDL  Ears WDL  Nose WDL  Mouth WDL  Neck WDL  Breast/Chest WDL  Shoulder Blades WDL  Spine WDL  (R) Arm/Elbow/Hand WDL  (L) Arm/Elbow/Hand WDL  Abdomen WDL  Groin WDL  Scrotum/Coccyx/Buttocks Redness and Blanching  (R) Leg WDL  (L) Leg WDL  (R) Heel/Foot/Toe WDL  (L) Heel/Foot/Toe WDL          Devices In Places Blood Pressure Cuff and Pulse Ox      Interventions In Place Pillows    Possible Skin Injury No    Pictures Uploaded Into Epic N/A  Wound Consult Placed N/A  RN Wound Prevention Protocol Ordered No

## 2024-08-30 NOTE — ASSESSMENT & PLAN NOTE
Presenting with progressively worsening right upper quadrant abdominal pain and jaundice  Likely multifactorial secondary to metastatic versus primary malignancy and biliary obstruction.  GI consulted to consider stent placement and endoscopic ultrasound.  Avoid hepatotoxins as much as possible.  I will order follow-up CMP     8/30: Patient being followed by GI, we appreciate further recommendations.  The plan is to do an ERCP, however it is unclear if it will be done today or tomorrow.  Depending on OR availability.    8/31: Patient to undergo ERCP today by GI, we appreciate further recommendations.

## 2024-08-30 NOTE — ASSESSMENT & PLAN NOTE
No known history of primary hypertension  Likely secondary to severe pain  Multimodal pain control  I will start as needed labetalol for extreme hypertension  Consider starting scheduled antihypertensive medications according to blood pressure trend

## 2024-08-30 NOTE — ASSESSMENT & PLAN NOTE
Likely multifactorial secondary to metastatic versus primary malignancy and biliary obstruction.  GI consulted to consider stent placement and endoscopic ultrasound.  Avoid hepatotoxins as much as possible.  I will order follow-up CMP

## 2024-08-30 NOTE — THERAPY
Physical Therapy Contact Note    Patient Name: Kadie Ashford  Age:  84 y.o., Sex:  female  Medical Record #: [id number]  Today's Date: 8/30/2024        PT orders received and chart reviewed. After discussion with care provider, the patient currently does not demonstrate or present with any deficits that require the need for skilled acute physical therapy services in the acute care setting at this time.   Orders will be completed at this time.   Please reorder if there is a change in the patient's current medical status that will require skilled PT intervention.   Thank you.

## 2024-08-30 NOTE — ASSESSMENT & PLAN NOTE
8/30: I discussed with patient for at least 16 minutes further goals of care.  This included CODE STATUS which includes full code and DNR/DNI.  The patient would like to be full code.  We also discussed further treatment goals.  For now the patient would like to have full treatment options.  This includes invasive or noninvasive procedures as needed.  In the event that the patient cannot make decisions for herself she would like her  Quintin to make decisions for her.

## 2024-08-31 ENCOUNTER — ANESTHESIA (OUTPATIENT)
Dept: SURGERY | Facility: MEDICAL CENTER | Age: 84
End: 2024-08-31
Payer: MEDICARE

## 2024-08-31 ENCOUNTER — APPOINTMENT (OUTPATIENT)
Dept: RADIOLOGY | Facility: MEDICAL CENTER | Age: 84
DRG: 444 | End: 2024-08-31
Attending: INTERNAL MEDICINE
Payer: MEDICARE

## 2024-08-31 ENCOUNTER — ANESTHESIA EVENT (OUTPATIENT)
Dept: SURGERY | Facility: MEDICAL CENTER | Age: 84
End: 2024-08-31
Payer: MEDICARE

## 2024-08-31 VITALS
WEIGHT: 111.99 LBS | SYSTOLIC BLOOD PRESSURE: 127 MMHG | BODY MASS INDEX: 19.12 KG/M2 | TEMPERATURE: 97.6 F | HEART RATE: 60 BPM | OXYGEN SATURATION: 97 % | DIASTOLIC BLOOD PRESSURE: 56 MMHG | RESPIRATION RATE: 17 BRPM | HEIGHT: 64 IN

## 2024-08-31 PROBLEM — E83.42 HYPOMAGNESEMIA: Status: ACTIVE | Noted: 2024-08-31

## 2024-08-31 LAB
AFP-TM SERPL-MCNC: 563 NG/ML (ref 0–9)
ALBUMIN SERPL BCP-MCNC: 2.5 G/DL (ref 3.2–4.9)
ALBUMIN/GLOB SERPL: 0.9 G/DL
ALP SERPL-CCNC: 410 U/L (ref 30–99)
ALT SERPL-CCNC: 65 U/L (ref 2–50)
ANION GAP SERPL CALC-SCNC: 9 MMOL/L (ref 7–16)
AST SERPL-CCNC: 81 U/L (ref 12–45)
BILIRUB SERPL-MCNC: 4.2 MG/DL (ref 0.1–1.5)
BUN SERPL-MCNC: 7 MG/DL (ref 8–22)
CALCIUM ALBUM COR SERPL-MCNC: 9.5 MG/DL (ref 8.5–10.5)
CALCIUM SERPL-MCNC: 8.3 MG/DL (ref 8.4–10.2)
CHLORIDE SERPL-SCNC: 105 MMOL/L (ref 96–112)
CO2 SERPL-SCNC: 22 MMOL/L (ref 20–33)
CREAT SERPL-MCNC: 0.45 MG/DL (ref 0.5–1.4)
ERYTHROCYTE [DISTWIDTH] IN BLOOD BY AUTOMATED COUNT: 58.7 FL (ref 35.9–50)
FERRITIN SERPL-MCNC: 762 NG/ML (ref 10–291)
GFR SERPLBLD CREATININE-BSD FMLA CKD-EPI: 94 ML/MIN/1.73 M 2
GLOBULIN SER CALC-MCNC: 2.8 G/DL (ref 1.9–3.5)
GLUCOSE SERPL-MCNC: 108 MG/DL (ref 65–99)
HCT VFR BLD AUTO: 24.5 % (ref 37–47)
HCT VFR BLD AUTO: 26.3 % (ref 37–47)
HGB BLD-MCNC: 7.9 G/DL (ref 12–16)
HGB BLD-MCNC: 8.2 G/DL (ref 12–16)
IRON SATN MFR SERPL: 23 % (ref 15–55)
IRON SERPL-MCNC: 29 UG/DL (ref 40–170)
MAGNESIUM SERPL-MCNC: 1.9 MG/DL (ref 1.5–2.5)
MCH RBC QN AUTO: 26.4 PG (ref 27–33)
MCHC RBC AUTO-ENTMCNC: 32.2 G/DL (ref 32.2–35.5)
MCV RBC AUTO: 81.9 FL (ref 81.4–97.8)
PLATELET # BLD AUTO: 364 K/UL (ref 164–446)
PMV BLD AUTO: 8.9 FL (ref 9–12.9)
POTASSIUM SERPL-SCNC: 3.8 MMOL/L (ref 3.6–5.5)
PROT SERPL-MCNC: 5.3 G/DL (ref 6–8.2)
RBC # BLD AUTO: 2.99 M/UL (ref 4.2–5.4)
SODIUM SERPL-SCNC: 136 MMOL/L (ref 135–145)
TIBC SERPL-MCNC: 124 UG/DL (ref 250–450)
UIBC SERPL-MCNC: 95 UG/DL (ref 110–370)
WBC # BLD AUTO: 9.9 K/UL (ref 4.8–10.8)

## 2024-08-31 PROCEDURE — 83540 ASSAY OF IRON: CPT

## 2024-08-31 PROCEDURE — 85014 HEMATOCRIT: CPT

## 2024-08-31 PROCEDURE — 80053 COMPREHEN METABOLIC PANEL: CPT

## 2024-08-31 PROCEDURE — 36415 COLL VENOUS BLD VENIPUNCTURE: CPT

## 2024-08-31 PROCEDURE — 770001 HCHG ROOM/CARE - MED/SURG/GYN PRIV*

## 2024-08-31 PROCEDURE — 700101 HCHG RX REV CODE 250: Performed by: ANESTHESIOLOGY

## 2024-08-31 PROCEDURE — 74328 X-RAY BILE DUCT ENDOSCOPY: CPT

## 2024-08-31 PROCEDURE — 160048 HCHG OR STATISTICAL LEVEL 1-5: Performed by: INTERNAL MEDICINE

## 2024-08-31 PROCEDURE — 160208 HCHG ENDO MINUTES - EA ADDL 1 MIN LEVEL 4: Performed by: INTERNAL MEDICINE

## 2024-08-31 PROCEDURE — 700102 HCHG RX REV CODE 250 W/ 637 OVERRIDE(OP): Performed by: INTERNAL MEDICINE

## 2024-08-31 PROCEDURE — 160009 HCHG ANES TIME/MIN: Performed by: INTERNAL MEDICINE

## 2024-08-31 PROCEDURE — A9270 NON-COVERED ITEM OR SERVICE: HCPCS | Performed by: HOSPITALIST

## 2024-08-31 PROCEDURE — A9270 NON-COVERED ITEM OR SERVICE: HCPCS | Performed by: INTERNAL MEDICINE

## 2024-08-31 PROCEDURE — 94760 N-INVAS EAR/PLS OXIMETRY 1: CPT

## 2024-08-31 PROCEDURE — 83550 IRON BINDING TEST: CPT

## 2024-08-31 PROCEDURE — C2617 STENT, NON-COR, TEM W/O DEL: HCPCS | Performed by: INTERNAL MEDICINE

## 2024-08-31 PROCEDURE — 700102 HCHG RX REV CODE 250 W/ 637 OVERRIDE(OP): Performed by: HOSPITALIST

## 2024-08-31 PROCEDURE — 82728 ASSAY OF FERRITIN: CPT

## 2024-08-31 PROCEDURE — 85027 COMPLETE CBC AUTOMATED: CPT

## 2024-08-31 PROCEDURE — 160002 HCHG RECOVERY MINUTES (STAT): Performed by: INTERNAL MEDICINE

## 2024-08-31 PROCEDURE — C1769 GUIDE WIRE: HCPCS | Performed by: INTERNAL MEDICINE

## 2024-08-31 PROCEDURE — 99233 SBSQ HOSP IP/OBS HIGH 50: CPT | Performed by: INTERNAL MEDICINE

## 2024-08-31 PROCEDURE — 160035 HCHG PACU - 1ST 60 MINS PHASE I: Performed by: INTERNAL MEDICINE

## 2024-08-31 PROCEDURE — 83735 ASSAY OF MAGNESIUM: CPT

## 2024-08-31 PROCEDURE — 0F798DZ DILATION OF COMMON BILE DUCT WITH INTRALUMINAL DEVICE, VIA NATURAL OR ARTIFICIAL OPENING ENDOSCOPIC: ICD-10-PCS | Performed by: INTERNAL MEDICINE

## 2024-08-31 PROCEDURE — 700105 HCHG RX REV CODE 258: Performed by: INTERNAL MEDICINE

## 2024-08-31 PROCEDURE — 700111 HCHG RX REV CODE 636 W/ 250 OVERRIDE (IP): Performed by: INTERNAL MEDICINE

## 2024-08-31 PROCEDURE — 700111 HCHG RX REV CODE 636 W/ 250 OVERRIDE (IP): Mod: JZ | Performed by: ANESTHESIOLOGY

## 2024-08-31 PROCEDURE — 110371 HCHG SHELL REV 272: Performed by: INTERNAL MEDICINE

## 2024-08-31 PROCEDURE — 85018 HEMOGLOBIN: CPT

## 2024-08-31 PROCEDURE — 700111 HCHG RX REV CODE 636 W/ 250 OVERRIDE (IP): Performed by: ANESTHESIOLOGY

## 2024-08-31 PROCEDURE — 160203 HCHG ENDO MINUTES - 1ST 30 MINS LEVEL 4: Performed by: INTERNAL MEDICINE

## 2024-08-31 DEVICE — STENT ADVANIX CENTER BEND BILIARY 17X12CM X 15CM: Type: IMPLANTABLE DEVICE | Status: FUNCTIONAL

## 2024-08-31 RX ORDER — DIPHENHYDRAMINE HYDROCHLORIDE 50 MG/ML
12.5 INJECTION INTRAMUSCULAR; INTRAVENOUS
Status: DISCONTINUED | OUTPATIENT
Start: 2024-08-31 | End: 2024-08-31 | Stop reason: HOSPADM

## 2024-08-31 RX ORDER — HYDROMORPHONE HYDROCHLORIDE 1 MG/ML
0.4 INJECTION, SOLUTION INTRAMUSCULAR; INTRAVENOUS; SUBCUTANEOUS
Status: DISCONTINUED | OUTPATIENT
Start: 2024-08-31 | End: 2024-08-31 | Stop reason: HOSPADM

## 2024-08-31 RX ORDER — LIDOCAINE HYDROCHLORIDE 40 MG/ML
SOLUTION TOPICAL PRN
Status: DISCONTINUED | OUTPATIENT
Start: 2024-08-31 | End: 2024-08-31 | Stop reason: SURG

## 2024-08-31 RX ORDER — KETOROLAC TROMETHAMINE 15 MG/ML
INJECTION, SOLUTION INTRAMUSCULAR; INTRAVENOUS PRN
Status: DISCONTINUED | OUTPATIENT
Start: 2024-08-31 | End: 2024-08-31 | Stop reason: SURG

## 2024-08-31 RX ORDER — ONDANSETRON 2 MG/ML
4 INJECTION INTRAMUSCULAR; INTRAVENOUS
Status: COMPLETED | OUTPATIENT
Start: 2024-08-31 | End: 2024-08-31

## 2024-08-31 RX ORDER — OXYCODONE HCL 5 MG/5 ML
5 SOLUTION, ORAL ORAL
Status: DISCONTINUED | OUTPATIENT
Start: 2024-08-31 | End: 2024-08-31 | Stop reason: HOSPADM

## 2024-08-31 RX ORDER — IPRATROPIUM BROMIDE AND ALBUTEROL SULFATE 2.5; .5 MG/3ML; MG/3ML
3 SOLUTION RESPIRATORY (INHALATION)
Status: DISCONTINUED | OUTPATIENT
Start: 2024-08-31 | End: 2024-08-31 | Stop reason: HOSPADM

## 2024-08-31 RX ORDER — LIDOCAINE HYDROCHLORIDE 20 MG/ML
INJECTION, SOLUTION EPIDURAL; INFILTRATION; INTRACAUDAL; PERINEURAL PRN
Status: DISCONTINUED | OUTPATIENT
Start: 2024-08-31 | End: 2024-08-31 | Stop reason: SURG

## 2024-08-31 RX ORDER — MAGNESIUM SULFATE 1 G/100ML
1 INJECTION INTRAVENOUS ONCE
Status: COMPLETED | OUTPATIENT
Start: 2024-08-31 | End: 2024-08-31

## 2024-08-31 RX ORDER — OXYCODONE HCL 5 MG/5 ML
10 SOLUTION, ORAL ORAL
Status: DISCONTINUED | OUTPATIENT
Start: 2024-08-31 | End: 2024-08-31 | Stop reason: HOSPADM

## 2024-08-31 RX ORDER — SODIUM CHLORIDE, SODIUM GLUCONATE, SODIUM ACETATE, POTASSIUM CHLORIDE AND MAGNESIUM CHLORIDE 526; 502; 368; 37; 30 MG/100ML; MG/100ML; MG/100ML; MG/100ML; MG/100ML
500 INJECTION, SOLUTION INTRAVENOUS CONTINUOUS
Status: DISCONTINUED | OUTPATIENT
Start: 2024-08-31 | End: 2024-08-31 | Stop reason: HOSPADM

## 2024-08-31 RX ORDER — INDOMETHACIN 100 MG
100 SUPPOSITORY, RECTAL RECTAL ONCE
Status: COMPLETED | OUTPATIENT
Start: 2024-08-31 | End: 2024-08-31

## 2024-08-31 RX ORDER — MIDAZOLAM HYDROCHLORIDE 1 MG/ML
1 INJECTION INTRAMUSCULAR; INTRAVENOUS
Status: DISCONTINUED | OUTPATIENT
Start: 2024-08-31 | End: 2024-08-31 | Stop reason: HOSPADM

## 2024-08-31 RX ORDER — HYDROMORPHONE HYDROCHLORIDE 1 MG/ML
1 INJECTION, SOLUTION INTRAMUSCULAR; INTRAVENOUS; SUBCUTANEOUS
Status: DISCONTINUED | OUTPATIENT
Start: 2024-08-31 | End: 2024-08-31 | Stop reason: HOSPADM

## 2024-08-31 RX ORDER — SODIUM CHLORIDE, SODIUM LACTATE, POTASSIUM CHLORIDE, CALCIUM CHLORIDE 600; 310; 30; 20 MG/100ML; MG/100ML; MG/100ML; MG/100ML
INJECTION, SOLUTION INTRAVENOUS CONTINUOUS
Status: ACTIVE | OUTPATIENT
Start: 2024-08-31 | End: 2024-08-31

## 2024-08-31 RX ORDER — HYDROMORPHONE HYDROCHLORIDE 1 MG/ML
0.2 INJECTION, SOLUTION INTRAMUSCULAR; INTRAVENOUS; SUBCUTANEOUS
Status: DISCONTINUED | OUTPATIENT
Start: 2024-08-31 | End: 2024-08-31 | Stop reason: HOSPADM

## 2024-08-31 RX ORDER — MEPERIDINE HYDROCHLORIDE 25 MG/ML
12.5 INJECTION INTRAMUSCULAR; INTRAVENOUS; SUBCUTANEOUS
Status: DISCONTINUED | OUTPATIENT
Start: 2024-08-31 | End: 2024-08-31 | Stop reason: HOSPADM

## 2024-08-31 RX ORDER — ROCURONIUM BROMIDE 10 MG/ML
INJECTION, SOLUTION INTRAVENOUS PRN
Status: DISCONTINUED | OUTPATIENT
Start: 2024-08-31 | End: 2024-08-31 | Stop reason: SURG

## 2024-08-31 RX ORDER — DEXAMETHASONE SODIUM PHOSPHATE 4 MG/ML
INJECTION, SOLUTION INTRA-ARTICULAR; INTRALESIONAL; INTRAMUSCULAR; INTRAVENOUS; SOFT TISSUE PRN
Status: DISCONTINUED | OUTPATIENT
Start: 2024-08-31 | End: 2024-08-31 | Stop reason: SURG

## 2024-08-31 RX ORDER — ONDANSETRON 2 MG/ML
INJECTION INTRAMUSCULAR; INTRAVENOUS PRN
Status: DISCONTINUED | OUTPATIENT
Start: 2024-08-31 | End: 2024-08-31 | Stop reason: SURG

## 2024-08-31 RX ORDER — HALOPERIDOL 5 MG/ML
1 INJECTION INTRAMUSCULAR
Status: DISCONTINUED | OUTPATIENT
Start: 2024-08-31 | End: 2024-08-31 | Stop reason: HOSPADM

## 2024-08-31 RX ADMIN — MAGNESIUM SULFATE IN DEXTROSE 1 G: 10 INJECTION, SOLUTION INTRAVENOUS at 06:18

## 2024-08-31 RX ADMIN — Medication 100 MG: at 12:30

## 2024-08-31 RX ADMIN — ROCURONIUM BROMIDE 50 MG: 50 INJECTION, SOLUTION INTRAVENOUS at 10:58

## 2024-08-31 RX ADMIN — PROPOFOL 30 MG: 10 INJECTION, EMULSION INTRAVENOUS at 11:59

## 2024-08-31 RX ADMIN — ONDANSETRON 4 MG: 2 INJECTION INTRAMUSCULAR; INTRAVENOUS at 12:19

## 2024-08-31 RX ADMIN — ONDANSETRON 4 MG: 2 INJECTION INTRAMUSCULAR; INTRAVENOUS at 11:54

## 2024-08-31 RX ADMIN — KETOROLAC TROMETHAMINE 15 MG: 15 INJECTION, SOLUTION INTRAMUSCULAR; INTRAVENOUS at 11:54

## 2024-08-31 RX ADMIN — SODIUM CHLORIDE: 9 INJECTION, SOLUTION INTRAVENOUS at 15:57

## 2024-08-31 RX ADMIN — PROPOFOL 100 MG: 10 INJECTION, EMULSION INTRAVENOUS at 10:58

## 2024-08-31 RX ADMIN — LIDOCAINE HYDROCHLORIDE 40 MG: 20 INJECTION, SOLUTION EPIDURAL; INFILTRATION; INTRACAUDAL at 10:58

## 2024-08-31 RX ADMIN — SUGAMMADEX 200 MG: 100 INJECTION, SOLUTION INTRAVENOUS at 11:54

## 2024-08-31 RX ADMIN — SODIUM CHLORIDE, POTASSIUM CHLORIDE, SODIUM LACTATE AND CALCIUM CHLORIDE: 600; 310; 30; 20 INJECTION, SOLUTION INTRAVENOUS at 10:53

## 2024-08-31 RX ADMIN — DEXAMETHASONE SODIUM PHOSPHATE 8 MG: 4 INJECTION INTRA-ARTICULAR; INTRALESIONAL; INTRAMUSCULAR; INTRAVENOUS; SOFT TISSUE at 11:01

## 2024-08-31 RX ADMIN — SENNOSIDES AND DOCUSATE SODIUM 2 TABLET: 50; 8.6 TABLET ORAL at 17:23

## 2024-08-31 RX ADMIN — LIDOCAINE HYDROCHLORIDE 4 ML: 40 SOLUTION TOPICAL at 10:58

## 2024-08-31 ASSESSMENT — ENCOUNTER SYMPTOMS
BLURRED VISION: 0
ABDOMINAL PAIN: 1
COUGH: 0
FEVER: 0
NERVOUS/ANXIOUS: 0
DOUBLE VISION: 0
HEARTBURN: 0
DIZZINESS: 0
WEIGHT LOSS: 1
VOMITING: 0
SHORTNESS OF BREATH: 0
FALLS: 0
NAUSEA: 1
CHILLS: 0
PALPITATIONS: 0
BACK PAIN: 0
HEADACHES: 0

## 2024-08-31 ASSESSMENT — LIFESTYLE VARIABLES: SUBSTANCE_ABUSE: 0

## 2024-08-31 ASSESSMENT — PAIN DESCRIPTION - PAIN TYPE
TYPE: ACUTE PAIN
TYPE: ACUTE PAIN
TYPE: ACUTE PAIN;SURGICAL PAIN
TYPE: ACUTE PAIN

## 2024-08-31 ASSESSMENT — PAIN SCALES - GENERAL: PAIN_LEVEL: 0

## 2024-08-31 NOTE — ANESTHESIA POSTPROCEDURE EVALUATION
Patient: Kadie Ashford    Procedure Summary       Date: 08/31/24 Room / Location:  ENDOSCOPIC ULTRASOUND ROOM / SURGERY [facility]    Anesthesia Start: 1053 Anesthesia Stop: 1209    Procedure: ERCP (ENDOSCOPIC RETROGRADE CHOLANGIOPANCREATOGRAPHY) (Abdomen) Diagnosis: (normal oral pharynx, esophagus stomach duodenum, ampulla of vater normal on faus, 6 mm sphincterotomy, 12 cm /7 fr biliary stent placed in right system, good biliary drainage)    Surgeons: Kobi Orourke M.D. Responsible Provider: Brian Johnson III, M.D.    Anesthesia Type: general ASA Status: 3 - Emergent            Final Anesthesia Type: general  Last vitals  BP   Blood Pressure : (!) 167/67    Temp   36.3 °C (97.3 °F)    Pulse   70   Resp   16    SpO2   95 %      Anesthesia Post Evaluation    Patient location during evaluation: PACU  Patient participation: complete - patient participated  Level of consciousness: awake and alert  Pain score: 0    Airway patency: patent  Anesthetic complications: no  Cardiovascular status: hemodynamically stable  Respiratory status: acceptable  Hydration status: euvolemic    PONV: none          No notable events documented.     Nurse Pain Score: 0 (NPRS)

## 2024-08-31 NOTE — PROGRESS NOTES
Received patient from Night shift RN . Patient is comfortably sleeping,  alert and oriented x 4, [place]. On 2 liters via NC. Updated patient on NPO with Ice chips.  Fall precaution in place, kept bed in lowest position and call light within reach.  For surgery today.

## 2024-08-31 NOTE — THERAPY
"Occupational Therapy   Initial Evaluation     Patient Name: Kadie Ashford  Age:  84 y.o., Sex:  female  Medical Record #: [id number]  Today's Date: 8/30/2024     Precautions: Fall Risk    Assessment  Patient is 84 y.o. female admitted with abd pain, weakness, anorexia. Diagnosis of obstructive jaundice. Additional factors influencing patient status / progress: reports decreased activity lately. Pt is A&Ox3, [place], pleasant and cooperative. Mild STM deficits. Daughter in visiting, very supportive. Able to perform most AM ADL's with SBA/Spv. Observed pt \"furniture walking\"; pt has been not oob much and npo for procedure and states feeling a bit weak. Rec fww use at this time. See below for CLOF. No further acute OT needs.       Plan  OT Eval 1x only. Available for dc needs.   DC Equipment Recommendations: None  Discharge Recommendations: Anticipate that the patient will have no further occupational therapy needs after discharge from the hospital     Subjective  Agreeable     Objective   08/30/24 1046   Prior Living Situation   Prior Services None   Housing / Facility 2 Story House   Bathroom Set up Walk In Shower;Shower Chair   Equipment Owned Single Point Cane;Tub / Shower Seat   Lives with - Patient's Self Care Capacity Spouse   Comments Bedroom is on 1st floor. Daughter lives next door   Prior Level of ADL Function   Self Feeding Independent   Grooming / Hygiene Independent   Bathing Independent   Dressing Independent   Toileting Independent   Prior Level of IADL Function   Medication Management Independent   Laundry Requires Assist   Kitchen Mobility Independent   Finances Unable To Determine At This Time   Home Management Requires Assist   Shopping Requires Assist   Prior Level Of Mobility Independent Without Device in Home   Driving / Transportation Relatives / Others Provide Transportation   Occupation (Pre-Hospital Vocational) Retired Due To Age   Leisure Interests Unable To Determine At This Time   Precautions "   Precautions Fall Risk   Vitals   O2 Delivery Device None - Room Air   Pain 0 - 10 Group   Location Abdomen   Location Orientation Upper;Right   Therapist Pain Assessment Post Activity Pain Same as Prior to Activity;Nurse Notified   Cognition    Cognition / Consciousness WDL   Comments [place]   Passive ROM Upper Body   Passive ROM Upper Body WDL   Active ROM Upper Body   Active ROM Upper Body  WDL   Strength Upper Body   Upper Body Strength  WDL   Sensation Upper Body   Upper Extremity Sensation  WDL   Upper Body Muscle Tone   Upper Body Muscle Tone  WDL   Coordination Upper Body   Coordination WDL   Balance Assessment   Sitting Balance (Static) Good   Sitting Balance (Dynamic) Fair +   Standing Balance (Static) Good   Standing Balance (Dynamic) Fair +   Weight Shift Sitting Good   Weight Shift Standing Good   Bed Mobility    Supine to Sit Standby Assist   Sit to Supine Standby Assist   ADL Assessment   Grooming Standby Assist;Standing   Lower Body Dressing Standby Assist   Toileting Standby Assist   How much help from another person does the patient currently need...   Putting on and taking off regular lower body clothing? 4   Bathing (including washing, rinsing, and drying)? 3   Toileting, which includes using a toilet, bedpan, or urinal? 4   Putting on and taking off regular upper body clothing? 4   Taking care of personal grooming such as brushing teeth? 4   Eating meals? 4   6 Clicks Daily Activity Score 23   Functional Mobility   Sit to Stand Standby Assist   Bed, Chair, Wheelchair Transfer Standby Assist   Toilet Transfers Standby Assist   Transfer Method Stand Step   Mobility eob>br>sink>btb   Comments rec fww use due to pt a bit weaker (has been npo for procedure)   Visual Perception   Visual Perception  WDL   Activity Tolerance   Sitting in Chair 6   Sitting Edge of Bed 7   Standing 6   Education Group   Education Provided Activities of Daily Living;Home Safety   Role of Occupational Therapist Patient  Response Patient;Verbal Demonstration;Acceptance;Explanation   Home Safety Patient Response Patient;Verbal Demonstration;Acceptance;Explanation   ADL Patient Response Patient;Verbal Demonstration;Action Demonstration;Acceptance;Explanation   Anticipated Discharge Equipment and Recommendations   DC Equipment Recommendations None   Discharge Recommendations Anticipate that the patient will have no further occupational therapy needs after discharge from the hospital   Interdisciplinary Plan of Care Collaboration   IDT Collaboration with  Nursing;Family / Caregiver;Certified Nursing Assistant   Patient Position at End of Therapy In Bed;Bed Alarm On;Call Light within Reach;Tray Table within Reach;Phone within Reach   Collaboration Comments OT Erika, recs.

## 2024-08-31 NOTE — PROGRESS NOTES
"Hospital Medicine Daily Progress Note    Date of Service  8/31/2024    Chief Complaint  Kadie Ashford is a 84 y.o. female admitted 8/29/2024 with abdominal pain.    Hospital Course  As per chart review:  \"84 y.o. female with no known significant past medical history who presented 8/29/2024 with right upper quadrant, jaundice abdominal pain, generalized weakness and anorexia.  The patient has been having progressively worsening generalized weakness, easy fatigability and jaundice over the past few months.  The pain was initially mild but now is moderate.  She has noticed dark-colored urine and intermittent episodes of light-colored stools.  She denies having itching.  She denies having fevers or chills.\"    Interval Problem Update  8/30: Patient seen at bedside this morning.  Patient awaiting GI evaluation for possible intervention.  We appreciate further recommendations.  Patient currently n.p.o.  Patient currently not complaining of overt pain, however she still has some slight discomfort in her abdomen.    8/31: Patient seen at bedside this morning.  Hemoglobin seems to have dropped, however the patient receiving IV fluids.  There could be a dilutional component to it.  We have ordered iron studies.  Patient to undergo ERCP today, we appreciate further recommendations from GI.    I have discussed this patient's plan of care and discharge plan at IDT rounds today with Case Management, Nursing, Nursing leadership, and other members of the IDT team.    Consultants/Specialty  GI    Code Status  Full Code    Disposition  The patient is not medically cleared for discharge to home or a post-acute facility.        Review of Systems  Review of Systems   Constitutional:  Positive for malaise/fatigue and weight loss. Negative for chills and fever.   HENT:  Negative for hearing loss and nosebleeds.    Eyes:  Negative for blurred vision and double vision.   Respiratory:  Negative for cough and shortness of breath.  "   Cardiovascular:  Negative for chest pain and palpitations.   Gastrointestinal:  Positive for abdominal pain and nausea. Negative for heartburn and vomiting.   Genitourinary:  Negative for dysuria and urgency.   Musculoskeletal:  Negative for back pain and falls.   Skin:  Negative for itching and rash.   Neurological:  Negative for dizziness and headaches.   Psychiatric/Behavioral:  Negative for substance abuse. The patient is not nervous/anxious.    All other systems reviewed and are negative.       Physical Exam  Temp:  [36.3 °C (97.4 °F)-37.1 °C (98.7 °F)] 36.5 °C (97.7 °F)  Pulse:  [65-93] 65  Resp:  [18] 18  BP: (127-151)/(59-85) 134/59  SpO2:  [85 %-98 %] 94 %    Physical Exam  Vitals and nursing note reviewed.   Constitutional:       General: She is not in acute distress.     Appearance: She is ill-appearing.   HENT:      Head: Normocephalic and atraumatic.      Right Ear: External ear normal.      Left Ear: External ear normal.      Mouth/Throat:      Pharynx: No oropharyngeal exudate or posterior oropharyngeal erythema.   Eyes:      General:         Right eye: No discharge.         Left eye: No discharge.   Cardiovascular:      Rate and Rhythm: Normal rate and regular rhythm.      Pulses: Normal pulses.      Heart sounds: No murmur heard.     No gallop.   Pulmonary:      Effort: Pulmonary effort is normal. No respiratory distress.      Breath sounds: Normal breath sounds. No wheezing or rhonchi.   Abdominal:      General: Bowel sounds are normal. There is no distension.      Palpations: Abdomen is soft.      Tenderness: There is abdominal tenderness. There is no guarding.   Musculoskeletal:         General: No swelling or tenderness. Normal range of motion.      Cervical back: Normal range of motion and neck supple. No tenderness.   Skin:     General: Skin is warm and dry.      Coloration: Skin is jaundiced.   Neurological:      General: No focal deficit present.      Mental Status: She is alert and  oriented to person, place, and time. Mental status is at baseline.      Motor: No weakness.   Psychiatric:         Mood and Affect: Mood normal.         Behavior: Behavior normal.         Fluids    Intake/Output Summary (Last 24 hours) at 8/31/2024 1129  Last data filed at 8/31/2024 0805  Gross per 24 hour   Intake 2232.04 ml   Output 775 ml   Net 1457.04 ml        Laboratory  Recent Labs     08/29/24  1405 08/30/24  0245 08/30/24  0822 08/31/24  0325 08/31/24  0918   WBC 12.1* 10.9*  --  9.9  --    RBC 4.02* 3.51*  --  2.99*  --    HEMOGLOBIN 10.5* 9.2* 9.2* 7.9* 8.2*   HEMATOCRIT 32.6* 28.8* 29.3* 24.5* 26.3*   MCV 81.1* 82.1  --  81.9  --    MCH 26.1* 26.2*  --  26.4*  --    MCHC 32.2 31.9*  --  32.2  --    RDW 56.0* 55.9*  --  58.7*  --    PLATELETCT 500* 401  --  364  --    MPV 9.2 9.0  --  8.9*  --      Recent Labs     08/29/24  1405 08/30/24  0245 08/31/24  0325   SODIUM 134* 133* 136   POTASSIUM 3.5* 3.8 3.8   CHLORIDE 95* 96 105   CO2 24 23 22   GLUCOSE 145* 120* 108*   BUN 11 9 7*   CREATININE 0.60 0.48* 0.45*   CALCIUM 9.4 8.8 8.3*     Recent Labs     08/29/24  1405 08/30/24  0245   APTT 28.2  --    INR 1.09 1.14*               Imaging  CT-ABDOMEN-PELVIS WITH   Final Result         1. There is extensive neoplastic involvement of the liver and gallbladder.   2. Intrahepatic biliary ductal dilatation.   3. Upper abdominal lymphadenopathy.   4. Atherosclerosis including coronary artery disease.      DX-CHEST-PORTABLE (1 VIEW)   Final Result      Mild left basilar atelectasis versus infiltrate.      [id number] DUCTS    (Results Pending)   DX-PORTABLE FLUORO > 1 HOUR    (Results Pending)        Assessment/Plan  * Obstructive jaundice- (present on admission)  Assessment & Plan  Presenting with progressively worsening right upper quadrant abdominal pain and jaundice  Likely multifactorial secondary to metastatic versus primary malignancy and biliary obstruction.  GI consulted to consider stent placement and  endoscopic ultrasound.  Avoid hepatotoxins as much as possible.  I will order follow-up CMP     8/30: Patient being followed by GI, we appreciate further recommendations.  The plan is to do an ERCP, however it is unclear if it will be done today or tomorrow.  Depending on OR availability.    8/31: Patient to undergo ERCP today by GI, we appreciate further recommendations.    Hypomagnesemia  Assessment & Plan  Replace as needed  monitor    Advance care planning  Assessment & Plan  8/30: I discussed with patient for at least 16 minutes further goals of care.  This included CODE STATUS which includes full code and DNR/DNI.  The patient would like to be full code.  We also discussed further treatment goals.  For now the patient would like to have full treatment options.  This includes invasive or noninvasive procedures as needed.  In the event that the patient cannot make decisions for herself she would like her  Quintin to make decisions for her.    Hyperglycemia  Assessment & Plan  Pending A1c  Monitor    8/31: A1c is 4.9, there is no need for further management at this time.    Abnormal CT scan showing extensive neoplastic involvement of the liver and gallbladder- (present on admission)  Assessment & Plan  Abnormal CT scan showing extensive neoplastic involvement of the liver and gallbladder   In addition there are multiple enlarged lymph nodes.  Case was discussed with surgical oncology, Dr. Llamas who recommended GI consultation to consider stent placement and endoscopic ultrasound for biopsy.  GI, Dr. Mckay consulted  The patient will need to follow-up with surgical oncology    8/30: Patient being followed by GI, we appreciate further recommendations.  The plan is to do an ERCP, however it is unclear if it will be done today or tomorrow.  Depending on OR availability.    8/31: Patient to undergo ERCP today by GI, we appreciate further recommendations.    Severe protein-calorie malnutrition (HCC)- (present  on admission)  Assessment & Plan  Patient has been having progressively worsening anorexia with reduced oral intake over the past 1 month  The patient has temporal muscle wasting.  The patient has thenar and hypothenar wasting.   I will start on nutritional boost supplements.  Nutrition consult.  I will monitor his magnesium and phosphorus for potential refeeding.     Elevated blood pressure reading- (present on admission)  Assessment & Plan  No known history of primary hypertension  Likely secondary to severe pain  Multimodal pain control  I will start as needed labetalol for extreme hypertension  Consider starting scheduled antihypertensive medications according to blood pressure trend     Dehydration- (present on admission)  Assessment & Plan  Likely secondary to reduced oral intake   Encourage oral intake as tolerated, antiemetics as needed.  Intravenous hydration until adequate oral intake is achieved     Hypokalemia- (present on admission)  Assessment & Plan  Replace as needed  monitor    Hyponatremia- (present on admission)  Assessment & Plan  Mild  monitor    Anemia- (present on admission)  Assessment & Plan  8/31: Hemoglobin this morning was 7.9, repeat was 8.2.  No signs of overt bleeding.  We have ordered iron studies.  Continue to monitor closely.    Leukocytosis- (present on admission)  Assessment & Plan  Likely reactive secondary to metastatic cancer and dehydration  No focal sign of infection at this point   Continue to montior off antibiotics for now   I will order a follow-up CBC    8/31: White blood cell count seems to be improving without antibiotics.  Continue off antibiotics for now.  Patient remains afebrile.    Elevated liver enzymes- (present on admission)  Assessment & Plan  Likely multifactorial secondary to metastatic versus primary malignancy and biliary obstruction.  GI consulted to consider stent placement and endoscopic ultrasound.  Avoid hepatotoxins as much as possible.  I will order  follow-up CMP         VTE prophylaxis: SCDs    I have performed a physical exam and reviewed and updated ROS and Plan today (8/31/2024). In review of yesterday's note (8/30/2024), there are no changes except as documented above.      I spend at least 52 minutes providing care for this patient.  This included face-to-face interview, physical examination.  Review of lab work including CBC, hemoglobin, CMP, magnesium,  A1c.  Discussion with multidisciplinary team including case management, nursing staff and pharmacy.  Creating plan of care, reviewing orders.

## 2024-08-31 NOTE — CARE PLAN
The patient is Stable - Low risk of patient condition declining or worsening    Shift Goals  Clinical Goals: Tolerate IV fluids, maintain NPO status as of 0000 for procedure in am, pain management, remain free from falls.  Patient Goals: Pain control, rest.  Family Goals: KALEB    Progress made toward(s) clinical / shift goals:  Patient tolerated IV fluids and maintained NPO status per orders without issue. Patient reported tolerable pain with one administration of oxycodone. Patient had no falls or inury.    Patient is not progressing towards the following goals:      Problem: Nutrition  Goal: Patient's nutritional and fluid intake will be adequate or improve  Outcome: Not Progressing   Patient is NPO in preparation for surgery today.

## 2024-08-31 NOTE — CARE PLAN
The patient is Stable - Low risk of patient condition declining or worsening    Shift Goals  Clinical Goals: Patient will get procedure today  Patient Goals: get better  Family Goals: KALEB    Progress made toward(s) clinical / shift goals:  Patient had ERCP this morning, based from GI, plan for endoscopic ultrasound to evaluate heidy Hepatitis.  Patient was started on clear diet    Patient is not progressing towards the following goals:    Problem: Pain - Standard  Goal: Alleviation of pain or a reduction in pain to the patient’s comfort goal  Outcome: Progressing     Problem: Knowledge Deficit - Standard  Goal: Patient and family/care givers will demonstrate understanding of plan of care, disease process/condition, diagnostic tests and medications  Outcome: Progressing     Problem: Skin Integrity  Goal: Skin integrity is maintained or improved  Outcome: Progressing     Problem: Fall Risk  Goal: Patient will remain free from falls  Outcome: Progressing     Problem: Psychosocial  Goal: Patient's ability to verbalize feelings about condition will improve  Outcome: Progressing     Problem: Nutrition  Goal: Patient's nutritional and fluid intake will be adequate or improve  Outcome: Progressing     Problem: Gastrointestinal Irritability  Goal: Nausea and vomiting will be absent or improve  Outcome: Progressing     Problem: Mobility  Goal: Patient's capacity to carry out activities will improve  Outcome: Progressing     Problem: Self Care  Goal: Patient will have the ability to perform ADLs independently or with assistance (bathe, groom, dress, toilet and feed)  Outcome: Progressing

## 2024-08-31 NOTE — ANESTHESIA TIME REPORT
Anesthesia Start and Stop Event Times       Date Time Event    8/31/2024 1044 Ready for Procedure     1053 Anesthesia Start     1209 Anesthesia Stop          Responsible Staff  08/31/24      Name Role Begin End    Brian Johnson III, M.D. Anesth 1053 1209          Overtime Reason:  no overtime (within assigned shift)    Comments:

## 2024-08-31 NOTE — OR SURGEON
Post OP Note    PreOp Diagnosis: obstructive jaundice      PostOp Diagnosis:    1/ normal oral pharynx, esophagus stomach duodenum    2/ ampulla of vater normal on faus   3/ 6 mm sphincterotomy    4/ 12 cm /7 fr biliary stent placed in right system, good biliary drainage      Procedure(s):  ERCP (ENDOSCOPIC RETROGRADE CHOLANGIOPANCREATOGRAPHY) - Wound Class: Clean Contaminated    Surgeon(s):  Kobi Orourke M.D.    Anesthesiologist/Type of Anesthesia:  Anesthesiologist: Brian Johnson III, M.D./General    Surgical Staff:  Circulator: Ellen Au R.N.  Endoscopy Technician: Pinky Mckenzie    Specimens removed if any:  * No specimens in log *    Estimated Blood Loss: 3 cc    Findings:     Prior to the procedure the patient was informed of the risks and benefits of the procedure and freely signed the consent form.  She was monitored on standard monitoring blood pressure oxygen heart monitor no appreciable abnormalities were noted during the procedure.  Once adequate sedation was achieved she was placed on prone position head rotated to the right bite-block was placed.  Introduction of the standard Olympus ERCP scope was performed under indirect visualization through the oropharynx.  Intubation esophagus was achieved easily passed gradually into the gastric cavity.  No appreciable mucosal abnormalities were noted on visualization of the esophagus and the gastric cavity.  There is signs of a previous pyloroplasty.  Intubation of the pyloroplasty into the second portion of duodenum was achieved easily and the ampulla Vater was brought en face which appeared normal.  Selective cannulation of the common bile duct was performed using a 0.035 mm guidewire through a clever cut sphincterotome.  Aspiration was performed but no bleeding was material was noted.  Contrast was then subsequently injected which demonstrated a dilated common bile duct to approximately 8 mm a tapering at the proximal portion of the  gallbladder and the heidy hepatis and then dilated intrahepatic ducts.  The guidewire was attempted to manipulate into the right system but would not past subsequently an exchange was performed introducing a 0.025 mm guidewire which passed into the right hepatic system.  A small sphincterotomy was performed to 8 mm.  Exchange was then performed to perform an obstructive cholangiogram by introducing a 10 mm biliary balloon.  Obstructive cholangiogram demonstrated a narrowing and tapering at the proximal portion at the heidy hepatis.  Thus it was decided to place a 12 cm 7 [place] [employer] plastic biliary stent which bridged the narrowing in the heidy hepatis.  This was performed and introduction of the stent was achieved and good biliary drainage was appreciated endoscopically.  At this juncture the guidewire was removed and then subsequently gradually withdrawing of the ERCP scope to evaluate the gastric mucosa was performed along with the duodenum causing esophageal mucosa no overt mucosal abnormalities were noted.  Excess air was removed along with fluid and the patient tolerated the procedure well.    Complications: none    Recommendations  Indomethacin suppository 100 mg NM 1 time  Continue analgesics and antiemetics  Continue IV fluid  Continue IV supportive care  Consider clear liquid diet later today  Check labs CBC CMP lipase in the morning  Will require endoscopic ultrasound or interventional radiology to further evaluate heidy hepatis with biopsies and FNAs of this area.      8/31/2024 11:56 AM  Kobi Orourke M.D.

## 2024-08-31 NOTE — PROCEDURES
Post OP Note    PreOp Diagnosis: obstructive jaundice      PostOp Diagnosis:    1/ normal oral pharynx, esophagus stomach duodenum    2/ ampulla of vater normal on faus   3/ 6 mm sphincterotomy    4/ 12 cm /7 fr biliary stent placed in right system, good biliary drainage      Procedure(s):  ERCP (ENDOSCOPIC RETROGRADE CHOLANGIOPANCREATOGRAPHY) - Wound Class: Clean Contaminated    Surgeon(s):  Kobi Orourke M.D.    Anesthesiologist/Type of Anesthesia:  Anesthesiologist: Brian Johnson III, M.D./General    Surgical Staff:  Circulator: Ellen Au R.N.  Endoscopy Technician: Pinky Mckenzie    Specimens removed if any:  * No specimens in log *    Estimated Blood Loss: 3 cc    Findings:     Prior to the procedure the patient was informed of the risks and benefits of the procedure and freely signed the consent form.  She was monitored on standard monitoring blood pressure oxygen heart monitor no appreciable abnormalities were noted during the procedure.  Once adequate sedation was achieved she was placed on prone position head rotated to the right bite-block was placed.  Introduction of the standard Olympus ERCP scope was performed under indirect visualization through the oropharynx.  Intubation esophagus was achieved easily passed gradually into the gastric cavity.  No appreciable mucosal abnormalities were noted on visualization of the esophagus and the gastric cavity.  There is signs of a previous pyloroplasty.  Intubation of the pyloroplasty into the second portion of duodenum was achieved easily and the ampulla Vater was brought en face which appeared normal.  Selective cannulation of the common bile duct was performed using a 0.035 mm guidewire through a clever cut sphincterotome.  Aspiration was performed but no bleeding was material was noted.  Contrast was then subsequently injected which demonstrated a dilated common bile duct to approximately 8 mm a tapering at the proximal portion of the  gallbladder and the heidy hepatis and then dilated intrahepatic ducts.  The guidewire was attempted to manipulate into the right system but would not past subsequently an exchange was performed introducing a 0.025 mm guidewire which passed into the right hepatic system.  A small sphincterotomy was performed to 8 mm.  Exchange was then performed to perform an obstructive cholangiogram by introducing a 10 mm biliary balloon.  Obstructive cholangiogram demonstrated a narrowing and tapering at the proximal portion at the heidy hepatis.  Thus it was decided to place a 12 cm 7 Macedonian plastic biliary stent which bridged the narrowing in the heidy hepatis.  This was performed and introduction of the stent was achieved and good biliary drainage was appreciated endoscopically.  At this juncture the guidewire was removed and then subsequently gradually withdrawing of the ERCP scope to evaluate the gastric mucosa was performed along with the duodenum causing esophageal mucosa no overt mucosal abnormalities were noted.  Excess air was removed along with fluid and the patient tolerated the procedure well.    Complications: none    Recommendations  Indomethacin suppository 100 mg CO 1 time  Continue analgesics and antiemetics  Continue IV fluid  Continue IV supportive care  Consider clear liquid diet later today  Check labs CBC CMP lipase in the morning  Will require endoscopic ultrasound or interventional radiology to further evaluate heidy hepatis with biopsies and FNAs of this area.    CEA level elevated further labs pending last colonoscopy was about 20 yrs ago.    8/31/2024 11:56 AM  Kobi Orourke M.D.

## 2024-08-31 NOTE — ANESTHESIA PROCEDURE NOTES
Airway    Date/Time: 8/31/2024 10:58 AM    Performed by: Brian Johnson III, M.D.  Authorized by: Brian Johnson III, M.D.    Location:  OR  Urgency:  Elective  Difficult Airway: No    Indications for Airway Management:  Anesthesia      Spontaneous Ventilation: absent    Sedation Level:  Deep  Preoxygenated: Yes    Patient Position:  Sniffing  Final Airway Type:  Endotracheal airway  Final Endotracheal Airway:  ETT  Cuffed: Yes    Technique Used for Successful ETT Placement:  Direct laryngoscopy    Insertion Site:  Oral  Blade Type:  [place]  Laryngoscope Blade/Videolaryngoscope Blade Size:  2  ETT Size (mm):  7.5  Measured from:  Lips  ETT to Lips (cm):  21  Placement Verified by: auscultation and capnometry    Cormack-Lehane Classification:  Grade III - view of epiglottis only  Number of Attempts at Approach:  1

## 2024-08-31 NOTE — ANESTHESIA PREPROCEDURE EVALUATION
Case: [id number] Date/Time: 08/31/24 1030    Procedure: ERCP (ENDOSCOPIC RETROGRADE CHOLANGIOPANCREATOGRAPHY)    Location:  ENDOSCOPIC ULTRASOUND ROOM / SURGERY [facility]    Surgeons: Kobi Orourke M.D.            Relevant Problems   No relevant active problems       Physical Exam    Airway   Mallampati: II  TM distance: >3 FB  Neck ROM: full       Cardiovascular - normal exam  Rhythm: regular  Rate: normal  (-) murmur     Dental - normal exam           Pulmonary - normal exam  Breath sounds clear to auscultation     Abdominal    Neurological - normal exam         Other findings: Acute obstructive jaundice, severe anemia Hgb 7.9              Anesthesia Plan    ASA 3- EMERGENT       Plan - general       Airway plan will be ETT          Induction: intravenous    Postoperative Plan: Postoperative administration of opioids is intended.    Pertinent diagnostic labs and testing reviewed    Informed Consent:    Anesthetic plan and risks discussed with patient.    Use of blood products discussed with: patient whom consented to blood products.

## 2024-08-31 NOTE — OR NURSING
1207 : To PACU from endoscopy, report received from anesthesia and RN. Patient unresponsive to verbal stimuli, respirations are spontaneous and unlabored. VSS on 6L.     1215:  Patient denies pain and nausea. MD Gordon at bedside.     1219: Patient complaining of nausea, medicated per mar.    1230: Patient states nausea improved, denies pain. Suppository administered. Patient's daughter updated.     1245: No changes. Meets criteria to transfer to floor.     1250: Report to JAMES Trinidad.

## 2024-08-31 NOTE — PROGRESS NOTES
Assumed care of patient at 1915, bedside report received from JAMES Mead.  Patient resting quietly in bed, alert & oriented, calm & cooperative, NAD, breaths even and unlabored, VS stable on room air. Bed in low & locked position, call light and personal belongings within reach, fall precautions in place, IV fluids infusing per MAR. Patient updated on POC, verbalized understanding of NPO status as of 0000, denies further needs at this time. Hourly rounding continues.

## 2024-08-31 NOTE — PROGRESS NOTES
4 Eyes Skin Assessment Completed by Yajaira RN and Amos RN.    Head Jaundice  Ears Jaundice  Nose Jaundice  Mouth WDL  Neck Jaundice  Breast/Chest Jaundice  Shoulder Blades jaundice  Spine WDL  (R) Arm/Elbow/Hand Bruising,jaundice  (L) Arm/Elbow/Hand Bruising,jaundice  Abdomen jaundice  Groin WDL  Scrotum/Coccyx/Buttocks Redness and Blanching,jaundice  (R) Leg Jaundice  (L) Leg Jaundice  (R) Heel/Foot/Toe Jaundice  (L) Heel/Foot/Toe Jaundice          Devices In Places Blood Pressure Cuff and Pulse Ox      Interventions In Place Pillows    Possible Skin Injury No    Pictures Uploaded Into Epic N/A  Wound Consult Placed N/A  RN Wound Prevention Protocol Ordered No

## 2024-09-01 VITALS
RESPIRATION RATE: 16 BRPM | DIASTOLIC BLOOD PRESSURE: 70 MMHG | OXYGEN SATURATION: 94 % | TEMPERATURE: 97.1 F | BODY MASS INDEX: 19.46 KG/M2 | HEART RATE: 74 BPM | WEIGHT: 113.98 LBS | SYSTOLIC BLOOD PRESSURE: 142 MMHG | HEIGHT: 64 IN

## 2024-09-01 LAB
AFP-TM SERPL-MCNC: 547 NG/ML (ref 0–9)
ALBUMIN SERPL BCP-MCNC: 2.4 G/DL (ref 3.2–4.9)
ALBUMIN/GLOB SERPL: 0.8 G/DL
ALP SERPL-CCNC: 380 U/L (ref 30–99)
ALT SERPL-CCNC: 52 U/L (ref 2–50)
ANION GAP SERPL CALC-SCNC: 9 MMOL/L (ref 7–16)
AST SERPL-CCNC: 51 U/L (ref 12–45)
BILIRUB SERPL-MCNC: 2.2 MG/DL (ref 0.1–1.5)
BUN SERPL-MCNC: 9 MG/DL (ref 8–22)
CALCIUM ALBUM COR SERPL-MCNC: 9.7 MG/DL (ref 8.5–10.5)
CALCIUM SERPL-MCNC: 8.4 MG/DL (ref 8.4–10.2)
CHLORIDE SERPL-SCNC: 107 MMOL/L (ref 96–112)
CO2 SERPL-SCNC: 22 MMOL/L (ref 20–33)
CREAT SERPL-MCNC: 0.47 MG/DL (ref 0.5–1.4)
ERYTHROCYTE [DISTWIDTH] IN BLOOD BY AUTOMATED COUNT: 61.5 FL (ref 35.9–50)
GFR SERPLBLD CREATININE-BSD FMLA CKD-EPI: 93 ML/MIN/1.73 M 2
GLOBULIN SER CALC-MCNC: 2.9 G/DL (ref 1.9–3.5)
GLUCOSE SERPL-MCNC: 106 MG/DL (ref 65–99)
HCT VFR BLD AUTO: 28.4 % (ref 37–47)
HGB BLD-MCNC: 8.9 G/DL (ref 12–16)
MAGNESIUM SERPL-MCNC: 2.2 MG/DL (ref 1.5–2.5)
MCH RBC QN AUTO: 26.6 PG (ref 27–33)
MCHC RBC AUTO-ENTMCNC: 31.3 G/DL (ref 32.2–35.5)
MCV RBC AUTO: 84.8 FL (ref 81.4–97.8)
PHOSPHATE SERPL-MCNC: 2.8 MG/DL (ref 2.5–4.5)
PLATELET # BLD AUTO: 420 K/UL (ref 164–446)
PMV BLD AUTO: 9.9 FL (ref 9–12.9)
POTASSIUM SERPL-SCNC: 3.9 MMOL/L (ref 3.6–5.5)
PROT SERPL-MCNC: 5.3 G/DL (ref 6–8.2)
RBC # BLD AUTO: 3.35 M/UL (ref 4.2–5.4)
SODIUM SERPL-SCNC: 138 MMOL/L (ref 135–145)
WBC # BLD AUTO: 10.6 K/UL (ref 4.8–10.8)

## 2024-09-01 PROCEDURE — 36415 COLL VENOUS BLD VENIPUNCTURE: CPT

## 2024-09-01 PROCEDURE — A9270 NON-COVERED ITEM OR SERVICE: HCPCS | Performed by: HOSPITALIST

## 2024-09-01 PROCEDURE — 700102 HCHG RX REV CODE 250 W/ 637 OVERRIDE(OP): Performed by: HOSPITALIST

## 2024-09-01 PROCEDURE — 85027 COMPLETE CBC AUTOMATED: CPT

## 2024-09-01 PROCEDURE — 83735 ASSAY OF MAGNESIUM: CPT

## 2024-09-01 PROCEDURE — 700105 HCHG RX REV CODE 258: Performed by: INTERNAL MEDICINE

## 2024-09-01 PROCEDURE — 84100 ASSAY OF PHOSPHORUS: CPT

## 2024-09-01 PROCEDURE — 99239 HOSP IP/OBS DSCHRG MGMT >30: CPT | Performed by: INTERNAL MEDICINE

## 2024-09-01 PROCEDURE — 80053 COMPREHEN METABOLIC PANEL: CPT

## 2024-09-01 PROCEDURE — 94760 N-INVAS EAR/PLS OXIMETRY 1: CPT

## 2024-09-01 RX ORDER — POLYETHYLENE GLYCOL 3350 17 G/17G
1 POWDER, FOR SOLUTION ORAL
Status: CANCELLED | OUTPATIENT
Start: 2024-09-01

## 2024-09-01 RX ORDER — OXYCODONE HYDROCHLORIDE 5 MG/1
2.5 TABLET ORAL
Status: CANCELLED | OUTPATIENT
Start: 2024-09-01

## 2024-09-01 RX ORDER — ACETAMINOPHEN 325 MG/1
650 TABLET ORAL EVERY 6 HOURS PRN
Status: CANCELLED | OUTPATIENT
Start: 2024-09-01

## 2024-09-01 RX ORDER — AMOXICILLIN 250 MG
2 CAPSULE ORAL 2 TIMES DAILY
Status: CANCELLED | OUTPATIENT
Start: 2024-09-01

## 2024-09-01 RX ORDER — ONDANSETRON 4 MG/1
4 TABLET, ORALLY DISINTEGRATING ORAL EVERY 4 HOURS PRN
Status: CANCELLED | OUTPATIENT
Start: 2024-09-01

## 2024-09-01 RX ORDER — HYDROMORPHONE HYDROCHLORIDE 1 MG/ML
0.25 INJECTION, SOLUTION INTRAMUSCULAR; INTRAVENOUS; SUBCUTANEOUS
Status: CANCELLED | OUTPATIENT
Start: 2024-09-01

## 2024-09-01 RX ORDER — OXYCODONE HYDROCHLORIDE 5 MG/1
5 TABLET ORAL
Status: CANCELLED | OUTPATIENT
Start: 2024-09-01

## 2024-09-01 RX ORDER — ONDANSETRON 2 MG/ML
4 INJECTION INTRAMUSCULAR; INTRAVENOUS EVERY 4 HOURS PRN
Status: CANCELLED | OUTPATIENT
Start: 2024-09-01

## 2024-09-01 RX ORDER — LABETALOL HYDROCHLORIDE 5 MG/ML
10 INJECTION, SOLUTION INTRAVENOUS EVERY 4 HOURS PRN
Status: CANCELLED | OUTPATIENT
Start: 2024-09-01

## 2024-09-01 RX ADMIN — SENNOSIDES AND DOCUSATE SODIUM 2 TABLET: 50; 8.6 TABLET ORAL at 05:39

## 2024-09-01 RX ADMIN — SODIUM CHLORIDE: 9 INJECTION, SOLUTION INTRAVENOUS at 01:38

## 2024-09-01 ASSESSMENT — ENCOUNTER SYMPTOMS
HEARTBURN: 0
COUGH: 0
NERVOUS/ANXIOUS: 0
DOUBLE VISION: 0
ABDOMINAL PAIN: 1
CHILLS: 0
WEIGHT LOSS: 1
NAUSEA: 1
PALPITATIONS: 0
SHORTNESS OF BREATH: 0
FALLS: 0
VOMITING: 0
DIZZINESS: 0
HEADACHES: 0
BACK PAIN: 0
BLURRED VISION: 0
FEVER: 0

## 2024-09-01 ASSESSMENT — LIFESTYLE VARIABLES: SUBSTANCE_ABUSE: 0

## 2024-09-01 ASSESSMENT — FIBROSIS 4 INDEX: FIB4 SCORE: 1.43

## 2024-09-01 NOTE — PROGRESS NOTES
Assumed care of patient at 070, bedside report received from JMAES Mc.  Patient sitting up in bed visiting with daughter and SO at [facility], alert & oriented, calm & cooperative, NAD, breaths even and unlabored, VS stable on room air. Bed in low & locked position, call light and personal belongings within reach, fall precautions in place. Patient updated on POC, denies pain, denies further needs at this time. Hourly rounding continues.

## 2024-09-01 NOTE — PROGRESS NOTES
Assumed care of patient at 1904, bedside report received from JAMES Gates.  [facility] up in bed [facility], alert & oriented, calm & cooperative, NAD, breaths even and unlabored, VS stable on room air. Bed in low & locked position, call light and personal belongings within reach, fall precautions in place. Patient updated on POC, denies pain, denies further needs at this time. Hourly rounding continues.

## 2024-09-01 NOTE — DISCHARGE SUMMARY
"Discharge Summary    CHIEF COMPLAINT ON ADMISSION  Chief Complaint   Patient presents with    Abdominal Pain       Reason for Admission  Flank Pain     Admission Date  8/29/2024    CODE STATUS  Full Code    HPI & HOSPITAL COURSE  As per chart review:  \"84 y.o. female with no known significant past medical history who presented 8/29/2024 with right upper quadrant, jaundice abdominal pain, generalized weakness and anorexia.  The patient has been having progressively worsening generalized weakness, easy fatigability and jaundice over the past few months.  The pain was initially mild but now is moderate.  She has noticed dark-colored urine and intermittent episodes of light-colored stools.  She denies having itching.  She denies having fevers or chills.\"    Patient was admitted for further management and care.  The patient had a CT scan which reported extensive neoplastic involvement of the liver and gallbladder.  Intrahepatic biliary ductal dilation.  GI was consulted and the patient underwent ERCP.  I spoke to GI after the ERCP was done, they were unable to do brushing and they are recommending fine-needle aspiration by IR.  I have spoken to Dr. Talley from .  He is recommending transfer to [facility] for FNA.    Initially the patient was going to be transferred to [facility] to get FNA by IR, however after discussing with the patient and the patient's daughter and then discussing with GI they would like to be discharged home performing the interventional radiology procedure as an outpatient.  I have placed referral to interventional radiology I have also placed referral to surgical oncology as I suspect the patient might require surgical oncology's input as well.    I spoke to GI and from their standpoint the patient can be discharged home.  Bilirubin seems to be trending down.  The patient is able to tolerate p.o.    Patient seen at bedside this morning.  Overall the patient feels somewhat better today.  She " would like to go home.  Will discharge patient home.  The patient will require close follow-up with PCP, GI, interventional radiology and surgical oncology as an outpatient.    Patient is on room air at the time of my evaluation.        Therefore, [facility] and [facility]    The patient met 2-midnight criteria for an inpatient stay at the time of discharge.    Discharge Date  09/01/2024    FOLLOW UP ITEMS POST DISCHARGE  The patient will be transferred to [facility] for further management and care.  The patient requires FNA by IR.    DISCHARGE DIAGNOSES  Principal Problem:    Obstructive jaundice (POA: Yes)  Active Problems:    Elevated liver enzymes (POA: Yes)    Leukocytosis (POA: Yes)    Anemia (POA: Yes)    Hyponatremia (POA: Yes)    Hypokalemia (POA: Yes)    Dehydration (POA: Yes)    Elevated blood pressure reading (POA: Yes)    Severe protein-calorie malnutrition (HCC) (POA: Yes)    Abnormal CT scan showing extensive neoplastic involvement of the liver and gallbladder (POA: Yes)    Hyperglycemia (POA: Unknown)    Advance care planning (POA: Unknown)    Hypomagnesemia (POA: Unknown)  Resolved Problems:    * No resolved hospital problems. *      FOLLOW UP    Dale Cornejo M.D.  [address] [facility]   [address]  [place] [id number] [phone]    Schedule an appointment as soon as possible for a visit      Interventional Radiology    Schedule an appointment as soon as possible for a visit      Surgical Oncology    Schedule an appointment as soon as possible for a visit      Kobi Orourke M.D.  [address] [place] [address]   [address]  [place] [id number]  [phone]    Schedule an appointment as soon as possible for a visit        MEDICATIONS ON DISCHARGE     Medication List        ASK your doctor about these medications        Instructions   hydrOXYzine HCl 25 MG Tabs  Commonly known as: Atarax   Take 1 Tablet by mouth 2 times a day as  needed.  Dose: 1 Tablet     ibuprofen 200 MG Tabs  Commonly known as: Motrin   Take 200 mg by mouth every 6 hours as needed for Mild Pain.  Dose: 200 mg     LUBRICATING EYE DROPS OP   Administer 1 Drop into affected eye(s) 1 time a day as needed (dry eyes).  Dose: 1 Drop     omeprazole 20 MG delayed-release capsule  Commonly known as: PriLOSEC   Take 20 mg by mouth every day.  Dose: 20 mg     ondansetron 4 MG Tbdp  Commonly known as: Zofran ODT   Take 4 mg by mouth every 6 hours as needed for Nausea/Vomiting.  Dose: 4 mg     vitamin D3 1000 Unit (25 mcg) Tabs  Commonly known as: Cholecalciferol   Take 2,000 Units by mouth every day.  Dose: 2,000 Units              Allergies  Allergies   Allergen Reactions    Food      Allergic to Scallops.    Sulfa Drugs        DIET  Orders Placed This Encounter   Procedures    Diet Order Diet: Low Fiber(GI Soft)     Standing Status:   Standing     Number of Occurrences:   1     Order Specific Question:   Diet:     Answer:   Low Fiber(GI Soft) [2]       ACTIVITY  As tolerated and directed by skilled nursing.      CONSULTATIONS  GI   IR - Dr Talley    PROCEDURES  Procedure(s):  ERCP (ENDOSCOPIC RETROGRADE CHOLANGIOPANCREATOGRAPHY) - Wound Class: Clean Contaminated    PostOp Diagnosis:               1/ normal oral pharynx, esophagus stomach duodenum               2/ ampulla of vater normal on faus              3/ 6 mm sphincterotomy               4/ 12 cm /7 fr biliary stent placed in right system, good biliary drainage        CT-ABDOMEN-PELVIS WITH   Final Result         1. There is extensive neoplastic involvement of the liver and gallbladder.   2. Intrahepatic biliary ductal dilatation.   3. Upper abdominal lymphadenopathy.   4. Atherosclerosis including coronary artery disease.      DX-CHEST-PORTABLE (1 VIEW)   Final Result      Mild left basilar atelectasis versus infiltrate.      LE-AARO-YRMPLZA DUCTS    (Results Pending)   DX-PORTABLE FLUORO > 1 HOUR    (Results Pending)    IR-CONSULT AND TREAT    (Results Pending)            LABORATORY  Lab Results   Component Value Date    SODIUM 138 09/01/2024    POTASSIUM 3.9 09/01/2024    CHLORIDE 107 09/01/2024    CO2 22 09/01/2024    GLUCOSE 106 (H) 09/01/2024    BUN 9 09/01/2024    CREATININE 0.47 (L) 09/01/2024        Lab Results   Component Value Date    WBC 10.6 09/01/2024    HEMOGLOBIN 8.9 (L) 09/01/2024    HEMATOCRIT 28.4 (L) 09/01/2024    PLATELETCT 420 09/01/2024        Total time of the discharge process exceeds 31 minutes.

## 2024-09-01 NOTE — CARE PLAN
The patient is Stable - Low risk of patient condition declining or worsening    Shift Goals  Clinical Goals: Monitor labs, advance diet as tolerated, promote BM, remain free from falls.  Patient Goals: Comfort, advance diet, rest.  Family Goals: Comfort, updates on POC.    Progress made toward(s) clinical / shift goals:  Patient's labs were collected, patient was able to tolerate advancement of diet to clears without nausea or vomiting. Patient had no falls or injury and patient was educated on promoting BM; patient accepted stool softeners this morning. Patient reported no pain this shift, and was able to rest for much of the night.    Patient is not progressing towards the following goals:

## 2024-09-01 NOTE — PROGRESS NOTES
"Hospital Medicine Daily Progress Note    Date of Service  9/1/2024    Chief Complaint  Kadie Ashford is a 84 y.o. female admitted 8/29/2024 with abdominal pain.    Hospital Course  As per chart review:  \"84 y.o. female with no known significant past medical history who presented 8/29/2024 with right upper quadrant, jaundice abdominal pain, generalized weakness and anorexia.  The patient has been having progressively worsening generalized weakness, easy fatigability and jaundice over the past few months.  The pain was initially mild but now is moderate.  She has noticed dark-colored urine and intermittent episodes of light-colored stools.  She denies having itching.  She denies having fevers or chills.\"    Interval Problem Update  8/30: Patient seen at bedside this morning.  Patient awaiting GI evaluation for possible intervention.  We appreciate further recommendations.  Patient currently n.p.o.  Patient currently not complaining of overt pain, however she still has some slight discomfort in her abdomen.    8/31: Patient seen at bedside this morning.  Hemoglobin seems to have dropped, however the patient receiving IV fluids.  There could be a dilutional component to it.  We have ordered iron studies.  Patient to undergo ERCP today, we appreciate further recommendations from GI.    9/1: Patient seen at bedside this morning.  Patient currently not complaining of overt pain.  Patient underwent ERCP yesterday by GI, I spoke to GI and they were unable to do brushing for biopsy.  They are recommending the patient to undergo fine-needle aspiration by IR.  I talked to Dr Talley from  who is recommending transfer to [facility] for FNA.    --UPDATE: Patient was initially scheduled to be transferred to [facility] for further management and care.  However the patient and the patient's family did not want to be transferred but they would like to follow-up as an outpatient.  I spoke to GI and from their standpoint the " patient can be discharged home with follow-up as an outpatient.  Referral to surgical oncology and IR have been placed.    I have discussed this patient's plan of care and discharge plan at IDT rounds today with Case Management, Nursing, Nursing leadership, and other members of the IDT team.    Consultants/Specialty  GI    Code Status  Full Code    Disposition  The patient is not medically cleared for discharge to home or a post-acute facility.        Review of Systems  Review of Systems   Constitutional:  Positive for malaise/fatigue and weight loss. Negative for chills and fever.   HENT:  Negative for hearing loss and nosebleeds.    Eyes:  Negative for blurred vision and double vision.   Respiratory:  Negative for cough and shortness of breath.    Cardiovascular:  Negative for chest pain and palpitations.   Gastrointestinal:  Positive for abdominal pain and nausea. Negative for heartburn and vomiting.   Genitourinary:  Negative for dysuria and urgency.   Musculoskeletal:  Negative for back pain and falls.   Skin:  Negative for itching and rash.   Neurological:  Negative for dizziness and headaches.   Psychiatric/Behavioral:  Negative for substance abuse. The patient is not nervous/anxious.    All other systems reviewed and are negative.       Physical Exam  Temp:  [35.8 °C (96.5 °F)-36.5 °C (97.7 °F)] 36.5 °C (97.7 °F)  Pulse:  [60-78] 66  Resp:  [14-18] 17  BP: (127-167)/(56-79) 143/69  SpO2:  [92 %-100 %] 95 %    Physical Exam  Vitals and nursing note reviewed.   Constitutional:       General: She is not in acute distress.     Appearance: She is ill-appearing.   HENT:      Head: Normocephalic and atraumatic.      Right Ear: External ear normal.      Left Ear: External ear normal.      Mouth/Throat:      Pharynx: No oropharyngeal exudate or posterior oropharyngeal erythema.   Eyes:      General:         Right eye: No discharge.         Left eye: No discharge.   Cardiovascular:      Rate and Rhythm: Normal rate  and regular rhythm.      Pulses: Normal pulses.      Heart sounds: No murmur heard.     No gallop.   Pulmonary:      Effort: Pulmonary effort is normal. No respiratory distress.      Breath sounds: Normal breath sounds. No wheezing or rhonchi.   Abdominal:      General: Bowel sounds are normal. There is no distension.      Palpations: Abdomen is soft.      Tenderness: There is abdominal tenderness. There is no guarding.   Musculoskeletal:         General: No swelling or tenderness. Normal range of motion.      Cervical back: Normal range of motion and neck supple. No tenderness.   Skin:     General: Skin is warm and dry.      Coloration: Skin is jaundiced.   Neurological:      General: No focal deficit present.      Mental Status: She is alert and oriented to person, place, and time. Mental status is at baseline.      Motor: No weakness.   Psychiatric:         Mood and Affect: Mood normal.         Behavior: Behavior normal.         Fluids    Intake/Output Summary (Last 24 hours) at 9/1/2024 1113  Last data filed at 9/1/2024 1028  Gross per 24 hour   Intake 2020 ml   Output 1741 ml   Net 279 ml        Laboratory  Recent Labs     08/30/24 0245 08/30/24 0822 08/31/24 0325 08/31/24 0918 09/01/24  0307   WBC 10.9*  --  9.9  --  10.6   RBC 3.51*  --  2.99*  --  3.35*   HEMOGLOBIN 9.2*   < > 7.9* 8.2* 8.9*   HEMATOCRIT 28.8*   < > 24.5* 26.3* 28.4*   MCV 82.1  --  81.9  --  84.8   MCH 26.2*  --  26.4*  --  26.6*   MCHC 31.9*  --  32.2  --  31.3*   RDW 55.9*  --  58.7*  --  61.5*   PLATELETCT 401  --  364  --  420   MPV 9.0  --  8.9*  --  9.9    < > = values in this interval not displayed.     Recent Labs     08/30/24 0245 08/31/24 0325 09/01/24  0307   SODIUM 133* 136 138   POTASSIUM 3.8 3.8 3.9   CHLORIDE 96 105 107   CO2 23 22 22   GLUCOSE 120* 108* 106*   BUN 9 7* 9   CREATININE 0.48* 0.45* 0.47*   CALCIUM 8.8 8.3* 8.4     Recent Labs     08/29/24  1405 08/30/24  0245   APTT 28.2  --    INR 1.09 1.14*                Imaging  CT-ABDOMEN-PELVIS WITH   Final Result         1. There is extensive neoplastic involvement of the liver and gallbladder.   2. Intrahepatic biliary ductal dilatation.   3. Upper abdominal lymphadenopathy.   4. Atherosclerosis including coronary artery disease.      DX-CHEST-PORTABLE (1 VIEW)   Final Result      Mild left basilar atelectasis versus infiltrate.      [id number] DUCTS    (Results Pending)   DX-PORTABLE FLUORO > 1 HOUR    (Results Pending)   IR-CONSULT AND TREAT    (Results Pending)        Assessment/Plan  * Obstructive jaundice- (present on admission)  Assessment & Plan  Presenting with progressively worsening right upper quadrant abdominal pain and jaundice  Likely multifactorial secondary to metastatic versus primary malignancy and biliary obstruction.  GI consulted to consider stent placement and endoscopic ultrasound.  Avoid hepatotoxins as much as possible.  I will order follow-up CMP     8/30: Patient being followed by GI, we appreciate further recommendations.  The plan is to do an ERCP, however it is unclear if it will be done today or tomorrow.  Depending on OR availability.    8/31: Patient to undergo ERCP today by GI, we appreciate further recommendations.    9/1: Patient underwent ERCP yesterday, I discussed with GI. They were unable to perform brushing, and they recommended IR FNA. I discussed with Dr Talley who recommended transfer to [facility].    Hypomagnesemia  Assessment & Plan  Replace as needed  monitor    Advance care planning  Assessment & Plan  8/30: I discussed with patient for at least 16 minutes further goals of care.  This included CODE STATUS which includes full code and DNR/DNI.  The patient would like to be full code.  We also discussed further treatment goals.  For now the patient would like to have full treatment options.  This includes invasive or noninvasive procedures as needed.  In the event that the patient cannot make decisions for herself she  would like her  Quintin to make decisions for her.    Hyperglycemia  Assessment & Plan  Pending A1c  Monitor    8/31: A1c is 4.9, there is no need for further management at this time.    Abnormal CT scan showing extensive neoplastic involvement of the liver and gallbladder- (present on admission)  Assessment & Plan  Abnormal CT scan showing extensive neoplastic involvement of the liver and gallbladder   In addition there are multiple enlarged lymph nodes.  Case was discussed with surgical oncology, Dr. Llamas who recommended GI consultation to consider stent placement and endoscopic ultrasound for biopsy.  GI, Dr. Mckay consulted  The patient will need to follow-up with surgical oncology    8/30: Patient being followed by GI, we appreciate further recommendations.  The plan is to do an ERCP, however it is unclear if it will be done today or tomorrow.  Depending on OR availability.    8/31: Patient to undergo ERCP today by GI, we appreciate further recommendations.    9/1: Patient underwent ERCP yesterday, I discussed with GI. They were unable to perform brushing, and they recommended IR FNA. I discussed with Dr Talley who recommended transfer to [facility].    Severe protein-calorie malnutrition (HCC)- (present on admission)  Assessment & Plan  Patient has been having progressively worsening anorexia with reduced oral intake over the past 1 month  The patient has temporal muscle wasting.  The patient has thenar and hypothenar wasting.   I will start on nutritional boost supplements.  Nutrition consult.  I will monitor his magnesium and phosphorus for potential refeeding.     Elevated blood pressure reading- (present on admission)  Assessment & Plan  No known history of primary hypertension  Likely secondary to severe pain  Multimodal pain control  I will start as needed labetalol for extreme hypertension  Consider starting scheduled antihypertensive medications according to blood pressure trend      Dehydration- (present on admission)  Assessment & Plan  Likely secondary to reduced oral intake   Encourage oral intake as tolerated, antiemetics as needed.  Intravenous hydration until adequate oral intake is achieved     Hypokalemia- (present on admission)  Assessment & Plan  Replace as needed  monitor    Hyponatremia- (present on admission)  Assessment & Plan  Mild  monitor    Anemia- (present on admission)  Assessment & Plan  8/31: Hemoglobin this morning was 7.9, repeat was 8.2.  No signs of overt bleeding.  We have ordered iron studies.  Continue to monitor closely.    Leukocytosis- (present on admission)  Assessment & Plan  Likely reactive secondary to metastatic cancer and dehydration  No focal sign of infection at this point   Continue to montior off antibiotics for now   I will order a follow-up CBC    9/1: White blood cell count seems to be improving without antibiotics.  Continue off antibiotics for now.  Patient remains afebrile.    Elevated liver enzymes- (present on admission)  Assessment & Plan  Likely multifactorial secondary to metastatic versus primary malignancy and biliary obstruction.  GI consulted to consider stent placement and endoscopic ultrasound.  Avoid hepatotoxins as much as possible.  I will order follow-up CMP    9/1: Patient underwent ERCP yesterday, I discussed with GI. They were unable to perform brushing, and they recommended IR FNA. I discussed with Dr Talley who recommended transfer to [facility].         VTE prophylaxis: SCDs    I have performed a physical exam and reviewed and updated ROS and Plan today (9/1/2024). In review of yesterday's note (8/31/2024), there are no changes except as documented above.

## 2024-09-01 NOTE — CARE PLAN
Problem: Pain - Standard  Goal: Alleviation of pain or a reduction in pain to the patient’s comfort goal  Outcome: Met     Problem: Knowledge Deficit - Standard  Goal: Patient and family/care givers will demonstrate understanding of plan of care, disease process/condition, diagnostic tests and medications  Outcome: Met     Problem: Skin Integrity  Goal: Skin integrity is maintained or improved  Outcome: Met     Problem: Fall Risk  Goal: Patient will remain free from falls  Outcome: Met     Problem: Psychosocial  Goal: Patient's ability to verbalize feelings about condition will improve  Outcome: Met     Problem: Nutrition  Goal: Patient's nutritional and fluid intake will be adequate or improve  Outcome: Met     Problem: Gastrointestinal Irritability  Goal: Nausea and vomiting will be absent or improve  Outcome: Met     Problem: Mobility  Goal: Patient's capacity to carry out activities will improve  Outcome: Met     Problem: Self Care  Goal: Patient will have the ability to perform ADLs independently or with assistance (bathe, groom, dress, toilet and feed)  Outcome: Met     The patient is Stable - Low risk of patient condition declining or worsening    Shift Goals  Clinical Goals: Monitor labs, advance diet as tolerated, promote BM, remain free from falls.  Patient Goals: Comfort, advance diet, rest.  Family Goals: Comfort, updates on POC.    Progress made toward(s) clinical / shift goals:  Goal met, pt ready for discharge    Patient is not progressing towards the following goals:

## 2024-09-04 ENCOUNTER — HOSPITAL ENCOUNTER (OUTPATIENT)
Dept: HEMATOLOGY ONCOLOGY | Facility: MEDICAL CENTER | Age: 84
End: 2024-09-04
Attending: NURSE PRACTITIONER
Payer: MEDICARE

## 2024-09-04 VITALS
HEART RATE: 170 BPM | DIASTOLIC BLOOD PRESSURE: 72 MMHG | HEIGHT: 64 IN | TEMPERATURE: 98.5 F | SYSTOLIC BLOOD PRESSURE: 120 MMHG | RESPIRATION RATE: 22 BRPM | BODY MASS INDEX: 21.81 KG/M2 | OXYGEN SATURATION: 97 % | WEIGHT: 127.76 LBS

## 2024-09-04 DIAGNOSIS — K76.9 LIVER LESION: ICD-10-CM

## 2024-09-04 DIAGNOSIS — R17 SERUM TOTAL BILIRUBIN ELEVATED: ICD-10-CM

## 2024-09-04 DIAGNOSIS — R77.2 ELEVATED AFP: ICD-10-CM

## 2024-09-04 DIAGNOSIS — R59.0 ABDOMINAL LYMPHADENOPATHY: ICD-10-CM

## 2024-09-04 DIAGNOSIS — K82.8 GALLBLADDER MASS: ICD-10-CM

## 2024-09-04 DIAGNOSIS — R97.0 ELEVATED CEA: ICD-10-CM

## 2024-09-04 PROCEDURE — 99204 OFFICE O/P NEW MOD 45 MIN: CPT | Performed by: NURSE PRACTITIONER

## 2024-09-04 PROCEDURE — 99212 OFFICE O/P EST SF 10 MIN: CPT | Performed by: NURSE PRACTITIONER

## 2024-09-04 ASSESSMENT — ENCOUNTER SYMPTOMS
NAUSEA: 1
DIZZINESS: 0
SHORTNESS OF BREATH: 1
WEIGHT LOSS: 1
CONSTIPATION: 0
COUGH: 1
PALPITATIONS: 0
DIARRHEA: 0
VOMITING: 0
ABDOMINAL PAIN: 1
FEVER: 0
MYALGIAS: 0
HEADACHES: 0
CHILLS: 0
DIAPHORESIS: 1

## 2024-09-04 ASSESSMENT — PAIN SCALES - GENERAL: PAINLEVEL: NO PAIN

## 2024-09-04 ASSESSMENT — FIBROSIS 4 INDEX: FIB4 SCORE: 1.43

## 2024-09-04 NOTE — LETTER
[facility]   [address] [place] [address],   [address]  [place] [address]  Phone: [phone]  Fax: [phone]              Kadie Ashford  1940    Encounter Date: 9/4/2024    RICH Hernandez          PROGRESS NOTE:  Subjective     Kadie Ashford is a 84 y.o. female who presents with New Patient (IOC new/ liver mass)          HPI    Patient referred to me, Intake Oncology Coordinator by self for liver and gallbladder mass.  Patient is accompanied by her  and daughter for today's visit.    Patient has been experiencing loss of appetite over the last year.  She actually lost weight last July 2023 after having COVID, approximately 12 pounds.  Starting January of this year she started losing more weight as her appetite was decreased.  She would also have nausea at times.  She did have a cat wound that required antibiotic that did cause some stomach upset with amoxicillin and therefore was started on Prilosec a few months ago.  Back in April 2024 she was having difficulty swallowing and underwent a barium swallow on 4/23/2024.  This was normal with no abnormality seen.      However just about 1 week ago she developed an sudden onset of right upper quadrant abdominal pain that would come and go.  For about 2 days the pain was there and then she immediately became jaundice and that is when family brought her into the hospital on 8/29/2024.  At that time labs were completed which found that she had significantly elevated liver enzymes with AST at 114, ALT at 102, alkaline phosphatase of 536, and total bilirubin was elevated at 4.5.  Labs completed also noted an elevated AFP at 563 with a repeat at 547.  CEA was also elevated at 104.    She did have a CT abdomen and pelvis with contrast completed at that time which showed extensive neoplastic involvement of the liver and gallbladder.  There was a gallbladder mass measuring 3.8 x 3.2 cm.  There was confluent with hepatic abnormality with confluent  masses involving the right hepatic lobe.  The total liver is enlarged measuring 20.2 cm.  She also had upper abdominal lymphadenopathy with the gastrohepatic ligament measuring 3.2 x 2.8 cm, heidy hepatis lymph node measuring 4.4 x 2.5 cm and a portacaval node measuring 4.2 x 2.9 cm.  I did personally viewed the image report images in detail, and I reviewed the report in detail with the patient and her family today.    Hospitalization she was seen by GI and had a stent placement.  After the stent placement her liver enzymes did slightly trend down, total bilirubin down to 2.2.  There was discussion about transferring patient to [facility] over the holiday weekend to undergo IR biopsy, however biopsy would be a few days out and therefore patient was stable enough to be discharged home to complete outpatient workup.    Clinically patient does look quite cachectic.  She does have mild jaundice still noted on physical exam.  She has been sleeping approximately 12-14 hours/day.  She also did state that she has noticed night sweats, not every night but has been going on for the last 3-4 months.  She does have significant fatigue noted and is short of breath.  She is tachycardic today up in the 170's.  The abdominal pain has improved but is still there with some intermittent nausea at times.    See past medical and surgical history below.    Patient is a very remote former smoker.  She smoked for approximately 10 years, quit in 1976.    Patient denies a family history of cancer.    Review of Systems   Constitutional:  Positive for diaphoresis (night sweats - note every night but for about 3-4 months), malaise/fatigue and weight loss. Negative for chills and fever.   Respiratory:  Positive for cough (since stent placement) and shortness of breath.    Cardiovascular:  Negative for chest pain and palpitations.   Gastrointestinal:  Positive for abdominal pain and nausea. Negative for constipation, diarrhea and  vomiting.   Genitourinary:  Negative for dysuria.   Musculoskeletal:  Negative for myalgias.   Skin:  Negative for itching and rash.   Neurological:  Negative for dizziness and headaches.          Allergies   Allergen Reactions   • Food      Allergic to Scallops.   • Sulfa Drugs      Current Outpatient Medications on File Prior to Encounter   Medication Sig Dispense Refill   • ondansetron (ZOFRAN ODT) 4 MG TABLET DISPERSIBLE Take 4 mg by mouth every 6 hours as needed for Nausea/Vomiting. (Patient not taking: Reported on 9/4/2024)     • hydrOXYzine HCl (ATARAX) 25 MG Tab Take 1 Tablet by mouth 2 times a day as needed. (Patient not taking: Reported on 9/4/2024)     • omeprazole (PRILOSEC) 20 MG delayed-release capsule Take 20 mg by mouth every day. (Patient not taking: Reported on 9/4/2024)     • vitamin D3 (CHOLECALCIFEROL) 1000 Unit (25 mcg) Tab Take 2,000 Units by mouth every day. (Patient not taking: Reported on 9/4/2024)     • ibuprofen (MOTRIN) 200 MG Tab Take 200 mg by mouth every 6 hours as needed for Mild Pain. (Patient not taking: Reported on 9/4/2024)     • Polyethyl Glycol-Propyl Glycol (LUBRICATING EYE DROPS OP) Administer 1 Drop into affected eye(s) 1 time a day as needed (dry eyes). (Patient not taking: Reported on 9/4/2024)       No current facility-administered medications on file prior to encounter.     Past Medical History:   Diagnosis Date   • Peptic ulcer     age 19     Past Surgical History:   Procedure Laterality Date   • [place] ERCP,DIAGNOSTIC  08/31/2024    Procedure: ERCP (ENDOSCOPIC RETROGRADE CHOLANGIOPANCREATOGRAPHY);  Surgeon: Kobi Orourke M.D.;  Location: SURGERY [facility];  Service: Gastroenterology   • APPENDECTOMY     • HYSTERECTOMY LAPAROSCOPY      age 43 - cyst on ovary   • OTHER      Nose SX in 1959     Family History   Problem Relation Age of Onset   • Cancer Neg Hx      Social History     Socioeconomic History   • Marital status:    Tobacco Use   • Smoking status:  "Former     Types: Cigarettes   • Smokeless tobacco: Never   • Tobacco comments:     quit in 1976 - smoked for 10 years total   Vaping Use   • Vaping status: Never Used   Substance and Sexual Activity   • Alcohol use: Not Currently     Comment: previous social at times   • Drug use: No   • Sexual activity: Not Currently     Social Determinants of Health     Food Insecurity: No Food Insecurity (8/29/2024)    Hunger Vital Sign    • Worried About Running Out of Food in the Last Year: Never true    • Ran Out of Food in the Last Year: Never true   Transportation Needs: No Transportation Needs (8/29/2024)    PRAPARE - Transportation    • Lack of Transportation (Medical): No    • Lack of Transportation (Non-Medical): No   Intimate Partner Violence: Not At Risk (8/29/2024)    Humiliation, Afraid, Rape, and Kick questionnaire    • Fear of Current or Ex-Partner: No    • Emotionally Abused: No    • Physically Abused: No    • Sexually Abused: No   Housing Stability: Low Risk  (8/29/2024)    Housing Stability Vital Sign    • Unable to Pay for Housing in the Last Year: No    • Number of Times Moved in the Last Year: 1    • Homeless in the Last Year: No         Objective     /72 (BP Location: Right arm, Patient Position: Sitting, BP Cuff Size: Adult)   Pulse (!) 170   Temp 36.9 °C (98.5 °F) (Temporal)   Resp (!) 22   Ht 1.626 m (5' 4.02\")   Wt 58 kg (127 lb 12.1 oz)   SpO2 97%   BMI 21.92 kg/m²      Physical Exam  Vitals reviewed.   Constitutional:       General: She is not in acute distress.     Appearance: She is ill-appearing. She is not diaphoretic.   HENT:      Head: Normocephalic and atraumatic.   Cardiovascular:      Rate and Rhythm: Regular rhythm. Tachycardia present.      Heart sounds: Normal heart sounds. No murmur heard.     No friction rub. No gallop.   Pulmonary:      Effort: Pulmonary effort is normal. No respiratory distress.      Breath sounds: Normal breath sounds. No wheezing.   Abdominal:      " General: Bowel sounds are normal. There is no distension.      Palpations: Abdomen is soft. There is mass.      Tenderness: There is no abdominal tenderness.   Musculoskeletal:         General: No swelling or tenderness.      Right lower leg: Edema (trace) present.      Left lower leg: Edema (trace) present.   Skin:     General: Skin is warm and dry.      Coloration: Skin is jaundiced (mild).   Neurological:      Mental Status: She is alert and oriented to person, place, and time.   Psychiatric:         Mood and Affect: Mood normal.         Behavior: Behavior normal.              IMAGING   DX-PORTABLE FLUORO > 1 HOUR    Result Date: 9/3/2024  8/31/2024 10:35 AM HISTORY/REASON FOR EXAM:  ERCP TECHNIQUE/EXAM DESCRIPTION AND NUMBER OF VIEWS: Portable fluoroscopy for greater than one hour in OR. FINDINGS: The portable fluoroscopy unit was obligated to the procedure for greater than one hour. Actual fluoro time was 7.3 minutes. Fluoroscopy dose(DAP): 7.6293 Gy*cm^2     Portable fluoroscopy utilized for 7.3 minutes. INTERPRETING LOCATION: [address] [place], [place] [address], [address]    [id number] DUCTS    Result Date: 9/3/2024  8/31/2024 10:35 AM HISTORY/REASON FOR EXAM:  ERCP TECHNIQUE/EXAM DESCRIPTION AND NUMBER OF VIEWS: 8 images for ERCP Digitized Intraoperative Radiograph FINDINGS: Fluoroscopic time: 7.3 minutes Fluoroscopy dose(DAP): 7.6293 Gy*cm^2     Digitized intraoperative radiograph is submitted for review. This examination is not for diagnostic purpose but for guidance during a surgical procedure. Please see the patient's chart for full procedural details. INTERPRETING LOCATION: [address] [place], [place] [address], [address]    CT-ABDOMEN-PELVIS WITH    Result Date: 8/29/2024 8/29/2024 3:42 PM HISTORY/REASON FOR EXAM:  amalia. Abdominal pain TECHNIQUE/EXAM DESCRIPTION:   CT scan of the abdomen and pelvis with contrast. Contrast-enhanced helical scanning was obtained from the diaphragmatic domes through the pubic symphysis following the  bolus administration of nonionic contrast without complication. 100 mL of Omnipaque 350 nonionic contrast was administered without complication. Low dose optimization technique was utilized for this CT exam including automated exposure control and adjustment of the mA and/or kV according to patient size. COMPARISON: No prior studies available. FINDINGS: Lower Chest: There is mild atelectasis versus scarring at the lung bases. There are a few tiny nodules measuring 2 mm. There is also a 5 mm pleural-based nodular density in the left lower lobe on image #6. Liver: There is marked heterogeneity with confluent masses predominantly involving the right hepatic lobe inferiorly. This is difficult to quantify. Additional low-density lesions are present which may represent cysts. The liver is enlarged measuring 20.2 cm. Spleen: Unremarkable. Pancreas: Somewhat atrophic. Gallbladder: There is a 3.8 x 3.2 cm mass within the gallbladder. This is confluent with the hepatic abnormality. It is unclear if this represents a hepatic neoplasm invading the gallbladder or gallbladder neoplasm invading the liver. Biliary: There is intrahepatic biliary ductal dilatation without dilatation of the extrahepatic biliary ducts. Adrenal glands: Mild thickening without a discrete mass. Kidneys: The kidneys enhance symmetrically. There are probable parapelvic cysts on the left. Bowel: No obstruction or acute inflammation. Lymph nodes: There is upper abdominal lymphadenopathy in the region of the gastrohepatic ligament, heidy hepatis, and portacaval regions. Gastrohepatic ligament lymph node measures 3.2 x 2.8 cm. There is a heidy hepatis lymph node measuring 4.4 x 2.5 cm.  There is a portacaval lymph node measuring 4.2 x 2.9 cm. Lymph nodes compress the portal vein and probably the common hepatic/common bile duct. Vasculature: Atherosclerosis including coronary artery disease. There is no aneurysmal dilatation of the aorta. Peritoneum:  Unremarkable without ascites or free intraperitoneal air. Pelvis: Patient is status post hysterectomy. Urinary bladder is unremarkable. Musculoskeletal: Degenerative changes are noted in the spine as well as about the hips and sacroiliac joints. There is anterior compression deformity of T12 which appears chronic.     1. There is extensive neoplastic involvement of the liver and gallbladder. 2. Intrahepatic biliary ductal dilatation. 3. Upper abdominal lymphadenopathy. 4. Atherosclerosis including coronary artery disease.    DX-CHEST-PORTABLE (1 VIEW)    Result Date: 8/29/2024 8/29/2024 2:20 PM HISTORY/REASON FOR EXAM:  Shortness of Breath. TECHNIQUE/EXAM DESCRIPTION AND NUMBER OF VIEWS: Single portable view of the chest. COMPARISON: None FINDINGS: The cardiomediastinal silhouette is within normal limits. There is mild atelectasis versus infiltrate at the left lung base. There is no definite pleural effusion or pneumothorax.     Mild left basilar atelectasis versus infiltrate.        LABS   Latest Reference Range & Units 08/29/24 14:05 08/30/24 02:45 08/30/24 08:22 08/31/24 03:25 08/31/24 09:18 09/01/24 03:07   WBC 4.8 - 10.8 K/uL 12.1 (H) 10.9 (H)  9.9  10.6   RBC 4.20 - 5.40 M/uL 4.02 (L) 3.51 (L)  2.99 (L)  3.35 (L)   Hemoglobin 12.0 - 16.0 g/dL 10.5 (L) 9.2 (L) 9.2 (L) 7.9 (L) 8.2 (L) 8.9 (L)   Hematocrit 37.0 - 47.0 % 32.6 (L) 28.8 (L) 29.3 (L) 24.5 (L) 26.3 (L) 28.4 (L)   MCV 81.4 - 97.8 fL 81.1 (L) 82.1  81.9  84.8   MCH 27.0 - 33.0 pg 26.1 (L) 26.2 (L)  26.4 (L)  26.6 (L)   MCHC 32.2 - 35.5 g/dL 32.2 31.9 (L)  32.2  31.3 (L)   RDW 35.9 - 50.0 fL 56.0 (H) 55.9 (H)  58.7 (H)  61.5 (H)   Platelet Count 164 - 446 K/uL 500 (H) 401  364  420   MPV 9.0 - 12.9 fL 9.2 9.0  8.9 (L)  9.9   Neutrophils-Polys 44.00 - 72.00 % 75.10 (H)        Neutrophils (Absolute) 1.82 - 7.42 K/uL 9.05 (H)        Lymphocytes 22.00 - 41.00 % 11.10 (L)        Lymphs (Absolute) 1.00 - 4.80 K/uL 1.34        Monocytes 0.00 - 13.40 % 12.40         Monos (Absolute) 0.00 - 0.85 K/uL 1.49 (H)        Eosinophils 0.00 - 6.90 % 0.20        Eos (Absolute) 0.00 - 0.51 K/uL 0.03        Basophils 0.00 - 1.80 % 0.40        Baso (Absolute) 0.00 - 0.12 K/uL 0.05        Immature Granulocytes 0.00 - 0.90 % 0.80        Immature Granulocytes (abs) 0.00 - 0.11 K/uL 0.10        Nucleated RBC 0.00 - 0.20 /100 WBC 0.00        NRBC (Absolute) K/uL 0.00           [facility] Reference Range & Units 08/29/24 14:05 08/30/24 02:45 08/31/24 03:25 09/01/24 03:07   Sodium 135 - 145 mmol/L 134 (L) 133 (L) 136 138   Potassium 3.6 - 5.5 mmol/L 3.5 (L) 3.8 3.8 3.9   Chloride 96 - 112 mmol/L 95 (L) 96 105 107   Co2 20 - 33 mmol/L 24 23 22 22   Anion Gap 7.0 - 16.0  15.0 14.0 9.0 9.0   Glucose 65 - 99 mg/dL 145 (H) 120 (H) 108 (H) 106 (H)   Bun 8 - 22 mg/dL 11 9 7 (L) 9   Creatinine 0.50 - 1.40 mg/dL 0.60 0.48 (L) 0.45 (L) 0.47 (L)   GFR (CKD-EPI) >60 mL/min/1.73 m 2 88 92 94 93   Calcium 8.4 - 10.2 mg/dL 9.4 8.8 8.3 (L) 8.4   Correct Calcium 8.5 - 10.5 mg/dL 10.0 9.8 9.5 9.7   AST(SGOT) 12 - 45 U/L 114 (H) 106 (H) 81 (H) 51 (H)   ALT(SGPT) 2 - 50 U/L 102 (H) 90 (H) 65 (H) 52 (H)   Alkaline Phosphatase 30 - 99 U/L 536 (H) 483 (H) 410 (H) 380 (H)   Total Bilirubin 0.1 - 1.5 mg/dL 4.5 (H) 4.8 (H) 4.2 (H) 2.2 (H)   Direct Bilirubin 0.1 - 0.5 mg/dL  3.9 (H)     Indirect Bilirubin 0.0 - 1.0 mg/dL  0.9     Albumin 3.2 - 4.9 g/dL 3.3 2.7 (L) 2.5 (L) 2.4 (L)   Total Protein 6.0 - 8.2 g/dL 7.0 6.3 5.3 (L) 5.3 (L)   Globulin 1.9 - 3.5 g/dL 3.7 (H) 3.6 (H) 2.8 2.9   A-G Ratio g/dL 0.9 0.8 0.9 0.8   Phosphorus 2.5 - 4.5 mg/dL    2.8   Magnesium 1.5 - 2.5 mg/dL  2.0 1.9 2.2   Lipase 11 - 82 U/L 33      Iron 40 - 170 ug/dL   29 (L)    Total Iron Binding 250 - 450 ug/dL   124 (L)    % Saturation 15 - 55 %   23    Unsat Iron Binding 110 - 370 ug/dL   95 (L)    Glycohemoglobin 4.0 - 5.6 %  4.9     Estim. Avg Glu mg/dL  94        Latest Reference Range & Units 08/30/24 02:45 08/30/24 14:05 08/31/24 03:25   Alpha  Fetoprotein 0 - 9 ng/mL 563 (H) 547 (H)    Carcinoembryonic Antigen 0.0 - 3.0 ng/mL  104.0 (H)    Ferritin 10.0 - 291.0 ng/mL   762.0 (H)              Assessment & Plan     1. Gallbladder mass  IR-BIOPSY-GENERIC      2. Liver lesion  IR-BIOPSY-GENERIC      3. Abdominal lymphadenopathy  IR-BIOPSY-GENERIC      4. Serum total bilirubin elevated  IR-BIOPSY-GENERIC      5. Elevated AFP  IR-BIOPSY-GENERIC      6. Elevated CEA  IR-BIOPSY-GENERIC              Long discussion today with patient and family with regards to the current findings.  We discussed that imaging is highly suspicious for malignancy as patient was aware of.  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  We discussed treatment options would not be surgical, but rather systemic therapy such as chemotherapy.  Patient is quite cachectic and we discussed that systemic therapy would be required and she would need to have better ECOG status before treatment would be an option. Patient very much interested in knowing the answers before making a complete decision which I did discuss with her is reasonable.  We discussed as well hospice as an option should she choose to not proceed with any treatment.    At this time patient will go ahead and proceed with biopsy and will follow-up with me after to review the results and discuss any further plan of care.  Patient did verbalize understanding's and agreed with the plan.      Please note that this dictation was created using voice recognition software. I have made every reasonable attempt to correct obvious errors, but I expect that there are errors of grammar and possibly content that I did not discover before finalizing the note.            Dale Cornejo M.D.  [address] [facility]   [place] [address]  [place] [id number]  Via Fax: [phone]

## 2024-09-04 NOTE — PROGRESS NOTES
Subjective     Kadie Ashford is a 84 y.o. female who presents with New Patient (IOC new/ liver mass)          HPI    Patient referred to me, Intake Oncology Coordinator by self for liver and gallbladder mass.  Patient is accompanied by her  and daughter for today's visit.    Patient has been experiencing loss of appetite over the last year.  She actually lost weight last July 2023 after having COVID, approximately 12 pounds.  Starting January of this year she started losing more weight as her appetite was decreased.  She would also have nausea at times.  She did have a cat wound that required antibiotic that did cause some stomach upset with amoxicillin and therefore was started on Prilosec a few months ago.  Back in April 2024 she was having difficulty swallowing and underwent a barium swallow on 4/23/2024.  This was normal with no abnormality seen.      However just about 1 week ago she developed an sudden onset of right upper quadrant abdominal pain that would come and go.  For about 2 days the pain was there and then she immediately became jaundice and that is when family brought her into the hospital on 8/29/2024.  At that time labs were completed which found that she had significantly elevated liver enzymes with AST at 114, ALT at 102, alkaline phosphatase of 536, and total bilirubin was elevated at 4.5.  Labs completed also noted an elevated AFP at 563 with a repeat at 547.  CEA was also elevated at 104.    She did have a CT abdomen and pelvis with contrast completed at that time which showed extensive neoplastic involvement of the liver and gallbladder.  There was a gallbladder mass measuring 3.8 x 3.2 cm.  There was confluent with hepatic abnormality with confluent masses involving the right hepatic lobe.  The total liver is enlarged measuring 20.2 cm.  She also had upper abdominal lymphadenopathy with the gastrohepatic ligament measuring 3.2 x 2.8 cm, heidy hepatis lymph node measuring 4.4 x 2.5  cm and a portacaval node measuring 4.2 x 2.9 cm.  I did personally viewed the image report images in detail, and I reviewed the report in detail with the patient and her family today.    Hospitalization she was seen by GI and had a stent placement.  After the stent placement her liver enzymes did slightly trend down, total bilirubin down to 2.2.  There was discussion about transferring patient to [facility] over the holiday weekend to undergo IR biopsy, however biopsy would be a few days out and therefore patient was stable enough to be discharged home to complete outpatient workup.    Clinically patient does look quite cachectic.  She does have mild jaundice still noted on physical exam.  She has been sleeping approximately 12-14 hours/day.  She also did state that she has noticed night sweats, not every night but has been going on for the last 3-4 months.  She does have significant fatigue noted and is short of breath.  She is tachycardic today up in the 170's.  The abdominal pain has improved but is still there with some intermittent nausea at times.    See past medical and surgical history below.    Patient is a very remote former smoker.  She smoked for approximately 10 years, quit in 1976.    Patient denies a family history of cancer.    Review of Systems   Constitutional:  Positive for diaphoresis (night sweats - note every night but for about 3-4 months), malaise/fatigue and weight loss. Negative for chills and fever.   Respiratory:  Positive for cough (since stent placement) and shortness of breath.    Cardiovascular:  Negative for chest pain and palpitations.   Gastrointestinal:  Positive for abdominal pain and nausea. Negative for constipation, diarrhea and vomiting.   Genitourinary:  Negative for dysuria.   Musculoskeletal:  Negative for myalgias.   Skin:  Negative for itching and rash.   Neurological:  Negative for dizziness and headaches.          Allergies   Allergen Reactions    Food       Allergic to Scallops.    Sulfa Drugs      Current Outpatient Medications on File Prior to Encounter   Medication Sig Dispense Refill    ondansetron (ZOFRAN ODT) 4 MG TABLET DISPERSIBLE Take 4 mg by mouth every 6 hours as needed for Nausea/Vomiting. (Patient not taking: Reported on 9/4/2024)      hydrOXYzine HCl (ATARAX) 25 MG Tab Take 1 Tablet by mouth 2 times a day as needed. (Patient not taking: Reported on 9/4/2024)      omeprazole (PRILOSEC) 20 MG delayed-release capsule Take 20 mg by mouth every day. (Patient not taking: Reported on 9/4/2024)      vitamin D3 (CHOLECALCIFEROL) 1000 Unit (25 mcg) Tab Take 2,000 Units by mouth every day. (Patient not taking: Reported on 9/4/2024)      ibuprofen (MOTRIN) 200 MG Tab Take 200 mg by mouth every 6 hours as needed for Mild Pain. (Patient not taking: Reported on 9/4/2024)      Polyethyl Glycol-Propyl Glycol (LUBRICATING EYE DROPS OP) Administer 1 Drop into affected eye(s) 1 time a day as needed (dry eyes). (Patient not taking: Reported on 9/4/2024)       No current facility-administered medications on file prior to encounter.     Past Medical History:   Diagnosis Date    Peptic ulcer     age 19     Past Surgical History:   Procedure Laterality Date    CO ERCP,DIAGNOSTIC  08/31/2024    Procedure: ERCP (ENDOSCOPIC RETROGRADE CHOLANGIOPANCREATOGRAPHY);  Surgeon: Kobi Orourke M.D.;  Location: SURGERY [facility];  Service: Gastroenterology    APPENDECTOMY      HYSTERECTOMY LAPAROSCOPY      age 43 - cyst on ovary    OTHER      Nose SX in 1959     Family History   Problem Relation Age of Onset    Cancer Neg Hx      Social History     Socioeconomic History    Marital status:    Tobacco Use    Smoking status: Former     Types: Cigarettes    Smokeless tobacco: Never    Tobacco comments:     quit in 1976 - smoked for 10 years total   Vaping Use    Vaping status: Never Used   Substance and Sexual Activity    Alcohol use: Not Currently     Comment: previous social at  "times    Drug use: No    Sexual activity: Not Currently     Social Determinants of Health     Food Insecurity: No Food Insecurity (8/29/2024)    Hunger Vital Sign     Worried About Running Out of Food in the Last Year: Never true     Ran Out of Food in the Last Year: Never true   Transportation Needs: No Transportation Needs (8/29/2024)    PRAPARE - Transportation     Lack of Transportation (Medical): No     Lack of Transportation (Non-Medical): No   Intimate Partner Violence: Not At Risk (8/29/2024)    Humiliation, Afraid, Rape, and Kick questionnaire     Fear of Current or Ex-Partner: No     Emotionally Abused: No     Physically Abused: No     Sexually Abused: No   Housing Stability: Low Risk  (8/29/2024)    Housing Stability Vital Sign     Unable to Pay for Housing in the Last Year: No     Number of Times Moved in the Last Year: 1     Homeless in the Last Year: No         Objective     /72 (BP Location: Right arm, Patient Position: Sitting, BP Cuff Size: Adult)   Pulse (!) 170   Temp 36.9 °C (98.5 °F) (Temporal)   Resp (!) 22   Ht 1.626 m (5' 4.02\")   Wt 58 kg (127 lb 12.1 oz)   SpO2 97%   BMI 21.92 kg/m²      Physical Exam  Vitals reviewed.   Constitutional:       General: She is not in acute distress.     Appearance: She is ill-appearing. She is not diaphoretic.   HENT:      Head: Normocephalic and atraumatic.   Cardiovascular:      Rate and Rhythm: Regular rhythm. Tachycardia present.      Heart sounds: Normal heart sounds. No murmur heard.     No friction rub. No gallop.   Pulmonary:      Effort: Pulmonary effort is normal. No respiratory distress.      Breath sounds: Normal breath sounds. No wheezing.   Abdominal:      General: Bowel sounds are normal. There is no distension.      Palpations: Abdomen is soft. There is mass.      Tenderness: There is no abdominal tenderness.   Musculoskeletal:         General: No swelling or tenderness.      Right lower leg: Edema (trace) present.      Left lower " leg: Edema (trace) present.   Skin:     General: Skin is warm and dry.      Coloration: Skin is jaundiced (mild).   Neurological:      Mental Status: She is alert and oriented to person, place, and time.   Psychiatric:         Mood and Affect: Mood normal.         Behavior: Behavior normal.              IMAGING   DX-PORTABLE FLUORO > 1 HOUR    Result Date: 9/3/2024  8/31/2024 10:35 AM HISTORY/REASON FOR EXAM:  ERCP TECHNIQUE/EXAM DESCRIPTION AND NUMBER OF VIEWS: Portable fluoroscopy for greater than one hour in OR. FINDINGS: The portable fluoroscopy unit was obligated to the procedure for greater than one hour. Actual fluoro time was 7.3 minutes. Fluoroscopy dose(DAP): 7.6293 Gy*cm^2     Portable fluoroscopy utilized for 7.3 minutes. INTERPRETING LOCATION: [address] [place] [address] [place], [id number]    [id number] DUCTS    Result Date: 9/3/2024  8/31/2024 10:35 AM HISTORY/REASON FOR EXAM:  ERCP TECHNIQUE/EXAM DESCRIPTION AND NUMBER OF VIEWS: 8 images for ERCP Digitized Intraoperative Radiograph FINDINGS: Fluoroscopic time: 7.3 minutes Fluoroscopy dose(DAP): 7.6293 Gy*cm^2     Digitized intraoperative radiograph is submitted for review. This examination is not for diagnostic purpose but for guidance during a surgical procedure. Please see the patient's chart for full procedural details. INTERPRETING LOCATION: [address] [place] [address] [place], [address]    CT-ABDOMEN-PELVIS WITH    Result Date: 8/29/2024 8/29/2024 3:42 PM HISTORY/REASON FOR EXAM:  juandice. Abdominal pain TECHNIQUE/EXAM DESCRIPTION:   CT scan of the abdomen and pelvis with contrast. Contrast-enhanced helical scanning was obtained from the diaphragmatic domes through the pubic symphysis following the bolus administration of nonionic contrast without complication. 100 mL of Omnipaque 350 nonionic contrast was administered without complication. Low dose optimization technique was utilized for this CT exam including automated exposure control and adjustment of the mA and/or kV  according to patient size. COMPARISON: No prior studies available. FINDINGS: Lower Chest: There is mild atelectasis versus scarring at the lung bases. There are a few tiny nodules measuring 2 mm. There is also a 5 mm pleural-based nodular density in the left lower lobe on image #6. Liver: There is marked heterogeneity with confluent masses predominantly involving the right hepatic lobe inferiorly. This is difficult to quantify. Additional low-density lesions are present which may represent cysts. The liver is enlarged measuring 20.2 cm. Spleen: Unremarkable. Pancreas: Somewhat atrophic. Gallbladder: There is a 3.8 x 3.2 cm mass within the gallbladder. This is confluent with the hepatic abnormality. It is unclear if this represents a hepatic neoplasm invading the gallbladder or gallbladder neoplasm invading the liver. Biliary: There is intrahepatic biliary ductal dilatation without dilatation of the extrahepatic biliary ducts. Adrenal glands: Mild thickening without a discrete mass. Kidneys: The kidneys enhance symmetrically. There are probable parapelvic cysts on the left. Bowel: No obstruction or acute inflammation. Lymph nodes: There is upper abdominal lymphadenopathy in the region of the gastrohepatic ligament, heidy hepatis, and portacaval regions. Gastrohepatic ligament lymph node measures 3.2 x 2.8 cm. There is a heidy hepatis lymph node measuring 4.4 x 2.5 cm.  There is a portacaval lymph node measuring 4.2 x 2.9 cm. Lymph nodes compress the portal vein and probably the common hepatic/common bile duct. Vasculature: Atherosclerosis including coronary artery disease. There is no aneurysmal dilatation of the aorta. Peritoneum: Unremarkable without ascites or free intraperitoneal air. Pelvis: Patient is status post hysterectomy. Urinary bladder is unremarkable. Musculoskeletal: Degenerative changes are noted in the spine as well as about the hips and sacroiliac joints. There is anterior compression deformity of  T12 which appears chronic.     1. There is extensive neoplastic involvement of the liver and gallbladder. 2. Intrahepatic biliary ductal dilatation. 3. Upper abdominal lymphadenopathy. 4. Atherosclerosis including coronary artery disease.    DX-CHEST-PORTABLE (1 VIEW)    Result Date: 8/29/2024 8/29/2024 2:20 PM HISTORY/REASON FOR EXAM:  Shortness of Breath. TECHNIQUE/EXAM DESCRIPTION AND NUMBER OF VIEWS: Single portable view of the chest. COMPARISON: None FINDINGS: The cardiomediastinal silhouette is within normal limits. There is mild atelectasis versus infiltrate at the left lung base. There is no definite pleural effusion or pneumothorax.     Mild left basilar atelectasis versus infiltrate.        LABS   Latest Reference Range & Units 08/29/24 14:05 08/30/24 02:45 08/30/24 08:22 08/31/24 03:25 08/31/24 09:18 09/01/24 03:07   WBC 4.8 - 10.8 K/uL 12.1 (H) 10.9 (H)  9.9  10.6   RBC 4.20 - 5.40 M/uL 4.02 (L) 3.51 (L)  2.99 (L)  3.35 (L)   Hemoglobin 12.0 - 16.0 g/dL 10.5 (L) 9.2 (L) 9.2 (L) 7.9 (L) 8.2 (L) 8.9 (L)   Hematocrit 37.0 - 47.0 % 32.6 (L) 28.8 (L) 29.3 (L) 24.5 (L) 26.3 (L) 28.4 (L)   MCV 81.4 - 97.8 fL 81.1 (L) 82.1  81.9  84.8   MCH 27.0 - 33.0 pg 26.1 (L) 26.2 (L)  26.4 (L)  26.6 (L)   MCHC 32.2 - 35.5 g/dL 32.2 31.9 (L)  32.2  31.3 (L)   RDW 35.9 - 50.0 fL 56.0 (H) 55.9 (H)  58.7 (H)  61.5 (H)   Platelet Count 164 - 446 K/uL 500 (H) 401  364  420   MPV 9.0 - 12.9 fL 9.2 9.0  8.9 (L)  9.9   Neutrophils-Polys 44.00 - 72.00 % 75.10 (H)        Neutrophils (Absolute) 1.82 - 7.42 K/uL 9.05 (H)        Lymphocytes 22.00 - 41.00 % 11.10 (L)        Lymphs (Absolute) 1.00 - 4.80 K/uL 1.34        Monocytes 0.00 - 13.40 % 12.40        Monos (Absolute) 0.00 - 0.85 K/uL 1.49 (H)        Eosinophils 0.00 - 6.90 % 0.20        Eos (Absolute) 0.00 - 0.51 K/uL 0.03        Basophils 0.00 - 1.80 % 0.40        Baso (Absolute) 0.00 - 0.12 K/uL 0.05        Immature Granulocytes 0.00 - 0.90 % 0.80        Immature Granulocytes  (abs) 0.00 - 0.11 K/uL 0.10        Nucleated RBC 0.00 - 0.20 /100 WBC 0.00        NRBC (Absolute) K/uL 0.00           [facility] Reference Range & Units 08/29/24 14:05 08/30/24 02:45 08/31/24 03:25 09/01/24 03:07   Sodium 135 - 145 mmol/L 134 (L) 133 (L) 136 138   Potassium 3.6 - 5.5 mmol/L 3.5 (L) 3.8 3.8 3.9   Chloride 96 - 112 mmol/L 95 (L) 96 105 107   Co2 20 - 33 mmol/L 24 23 22 22   Anion Gap 7.0 - 16.0  15.0 14.0 9.0 9.0   Glucose 65 - 99 mg/dL 145 (H) 120 (H) 108 (H) 106 (H)   Bun 8 - 22 mg/dL 11 9 7 (L) 9   Creatinine 0.50 - 1.40 mg/dL 0.60 0.48 (L) 0.45 (L) 0.47 (L)   GFR (CKD-EPI) >60 mL/min/1.73 m 2 88 92 94 93   Calcium 8.4 - 10.2 mg/dL 9.4 8.8 8.3 (L) 8.4   Correct Calcium 8.5 - 10.5 mg/dL 10.0 9.8 9.5 9.7   AST(SGOT) 12 - 45 U/L 114 (H) 106 (H) 81 (H) 51 (H)   ALT(SGPT) 2 - 50 U/L 102 (H) 90 (H) 65 (H) 52 (H)   Alkaline Phosphatase 30 - 99 U/L 536 (H) 483 (H) 410 (H) 380 (H)   Total Bilirubin 0.1 - 1.5 mg/dL 4.5 (H) 4.8 (H) 4.2 (H) 2.2 (H)   Direct Bilirubin 0.1 - 0.5 mg/dL  3.9 (H)     Indirect Bilirubin 0.0 - 1.0 mg/dL  0.9     Albumin 3.2 - 4.9 g/dL 3.3 2.7 (L) 2.5 (L) 2.4 (L)   Total Protein 6.0 - 8.2 g/dL 7.0 6.3 5.3 (L) 5.3 (L)   Globulin 1.9 - 3.5 g/dL 3.7 (H) 3.6 (H) 2.8 2.9   A-G Ratio g/dL 0.9 0.8 0.9 0.8   Phosphorus 2.5 - 4.5 mg/dL    2.8   Magnesium 1.5 - 2.5 mg/dL  2.0 1.9 2.2   Lipase 11 - 82 U/L 33      Iron 40 - 170 ug/dL   29 (L)    Total Iron Binding 250 - 450 ug/dL   124 (L)    % Saturation 15 - 55 %   23    Unsat Iron Binding 110 - 370 ug/dL   95 (L)    Glycohemoglobin 4.0 - 5.6 %  4.9     Estim. Avg Glu mg/dL  94        Latest Reference Range & Units 08/30/24 02:45 08/30/24 14:05 08/31/24 03:25   Alpha Fetoprotein 0 - 9 ng/mL 563 (H) 547 (H)    Carcinoembryonic Antigen 0.0 - 3.0 ng/mL  104.0 (H)    Ferritin 10.0 - 291.0 ng/mL   762.0 (H)              Assessment & Plan     1. Gallbladder mass  IR-BIOPSY-GENERIC      2. Liver lesion  IR-BIOPSY-GENERIC      3. Abdominal lymphadenopathy   IR-BIOPSY-GENERIC      4. Serum total bilirubin elevated  IR-BIOPSY-GENERIC      5. Elevated AFP  IR-BIOPSY-GENERIC      6. Elevated CEA  IR-BIOPSY-GENERIC              Long discussion today with patient and family with regards to the current findings.  We discussed that imaging is highly suspicious for malignancy as patient was aware of.  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  We discussed treatment options would not be surgical, but rather systemic therapy such as chemotherapy.  Patient is quite cachectic and we discussed that systemic therapy would be required and she would need to have better ECOG status before treatment would be an option. Patient very much interested in knowing the answers before making a complete decision which I did discuss with her is reasonable.  We discussed as well hospice as an option should she choose to not proceed with any treatment.    At this time patient will go ahead and proceed with biopsy and will follow-up with me after to review the results and discuss any further plan of care.  Patient did verbalize understanding's and agreed with the plan.      Please note that this dictation was created using voice recognition software. I have made every reasonable attempt to correct obvious errors, but I expect that there are errors of grammar and possibly content that I did not discover before finalizing the note.         Cigarettes

## 2024-09-04 NOTE — ADDENDUM NOTE
Encounter addended by: Nilsa Anderson, Med Ass't on: 9/4/2024 12:54 PM   Actions taken: Charge Capture section accepted

## 2024-09-09 ENCOUNTER — APPOINTMENT (OUTPATIENT)
Dept: ADMISSIONS | Facility: MEDICAL CENTER | Age: 84
End: 2024-09-09
Attending: INTERNAL MEDICINE
Payer: MEDICARE

## 2024-09-11 DIAGNOSIS — K82.8 GALLBLADDER MASS: ICD-10-CM

## 2024-09-11 DIAGNOSIS — R97.0 ELEVATED CEA: ICD-10-CM

## 2024-09-11 DIAGNOSIS — R77.2 ELEVATED AFP: ICD-10-CM

## 2024-09-11 DIAGNOSIS — R17 SERUM TOTAL BILIRUBIN ELEVATED: ICD-10-CM

## 2024-09-11 DIAGNOSIS — R59.0 ABDOMINAL LYMPHADENOPATHY: ICD-10-CM

## 2024-09-11 DIAGNOSIS — K76.9 LIVER LESION: ICD-10-CM

## 2024-09-11 NOTE — PROGRESS NOTES
Patient originally seen back on 9/4/2024 for her gallbladder and liver masses.  Patient has been scheduled for her biopsy on 9/18/2024.  Discussed with Dr. Zepeda and will have patient establish with him after the biopsy has been completed to discuss results and recommendations for further plan of care.  Spoke to patient's daughter today with regards to the referral and plan of care.        1. Gallbladder mass  Referral to Hematology Oncology      2. Liver lesion  Referral to Hematology Oncology      3. Abdominal lymphadenopathy  Referral to Hematology Oncology      4. Serum total bilirubin elevated  Referral to Hematology Oncology      5. Elevated AFP  Referral to Hematology Oncology      6. Elevated CEA  Referral to Hematology Oncology

## 2024-09-13 ENCOUNTER — PRE-ADMISSION TESTING (OUTPATIENT)
Dept: ADMISSIONS | Facility: MEDICAL CENTER | Age: 84
End: 2024-09-13
Attending: INTERNAL MEDICINE
Payer: MEDICARE

## 2024-09-18 ENCOUNTER — HOSPITAL ENCOUNTER (OUTPATIENT)
Facility: MEDICAL CENTER | Age: 84
End: 2024-09-18
Attending: INTERNAL MEDICINE | Admitting: INTERNAL MEDICINE
Payer: MEDICARE

## 2024-09-18 ENCOUNTER — APPOINTMENT (OUTPATIENT)
Dept: RADIOLOGY | Facility: MEDICAL CENTER | Age: 84
End: 2024-09-18
Attending: NURSE PRACTITIONER
Payer: MEDICARE

## 2024-09-18 VITALS
SYSTOLIC BLOOD PRESSURE: 142 MMHG | OXYGEN SATURATION: 94 % | BODY MASS INDEX: 19.5 KG/M2 | HEART RATE: 72 BPM | RESPIRATION RATE: 16 BRPM | DIASTOLIC BLOOD PRESSURE: 76 MMHG | HEIGHT: 64 IN | WEIGHT: 114.2 LBS | TEMPERATURE: 96.6 F

## 2024-09-18 DIAGNOSIS — K76.9 LIVER LESION: ICD-10-CM

## 2024-09-18 DIAGNOSIS — R97.0 ELEVATED CEA: ICD-10-CM

## 2024-09-18 DIAGNOSIS — R17 SERUM TOTAL BILIRUBIN ELEVATED: ICD-10-CM

## 2024-09-18 DIAGNOSIS — K82.8 GALLBLADDER MASS: ICD-10-CM

## 2024-09-18 DIAGNOSIS — R59.0 ABDOMINAL LYMPHADENOPATHY: ICD-10-CM

## 2024-09-18 DIAGNOSIS — R77.2 ELEVATED AFP: ICD-10-CM

## 2024-09-18 LAB
ERYTHROCYTE [DISTWIDTH] IN BLOOD BY AUTOMATED COUNT: 52.2 FL (ref 35.9–50)
HCT VFR BLD AUTO: 31.5 % (ref 37–47)
HGB BLD-MCNC: 9.8 G/DL (ref 12–16)
INR PPP: 1.18 (ref 0.87–1.13)
MCH RBC QN AUTO: 26 PG (ref 27–33)
MCHC RBC AUTO-ENTMCNC: 31.1 G/DL (ref 32.2–35.5)
MCV RBC AUTO: 83.6 FL (ref 81.4–97.8)
PATHOLOGY CONSULT NOTE: NORMAL
PLATELET # BLD AUTO: 414 K/UL (ref 164–446)
PMV BLD AUTO: 8.7 FL (ref 9–12.9)
PROTHROMBIN TIME: 15.1 SEC (ref 12–14.6)
RBC # BLD AUTO: 3.77 M/UL (ref 4.2–5.4)
WBC # BLD AUTO: 11.6 K/UL (ref 4.8–10.8)

## 2024-09-18 PROCEDURE — 88341 IMHCHEM/IMCYTCHM EA ADD ANTB: CPT | Mod: 91

## 2024-09-18 PROCEDURE — 160046 HCHG PACU - 1ST 60 MINS PHASE II

## 2024-09-18 PROCEDURE — 85610 PROTHROMBIN TIME: CPT

## 2024-09-18 PROCEDURE — 88342 IMHCHEM/IMCYTCHM 1ST ANTB: CPT

## 2024-09-18 PROCEDURE — 700111 HCHG RX REV CODE 636 W/ 250 OVERRIDE (IP)

## 2024-09-18 PROCEDURE — 85027 COMPLETE CBC AUTOMATED: CPT

## 2024-09-18 PROCEDURE — 4401636 CT-BIOPSY-LIVER PERCUTANEOUS

## 2024-09-18 PROCEDURE — 88307 TISSUE EXAM BY PATHOLOGIST: CPT

## 2024-09-18 PROCEDURE — 160047 HCHG PACU  - EA ADDL 30 MINS PHASE II

## 2024-09-18 PROCEDURE — 160035 HCHG PACU - 1ST 60 MINS PHASE I

## 2024-09-18 PROCEDURE — 160002 HCHG RECOVERY MINUTES (STAT)

## 2024-09-18 PROCEDURE — 700111 HCHG RX REV CODE 636 W/ 250 OVERRIDE (IP): Mod: JZ | Performed by: RADIOLOGY

## 2024-09-18 RX ORDER — SODIUM CHLORIDE 9 MG/ML
500 INJECTION, SOLUTION INTRAVENOUS
Status: DISCONTINUED | OUTPATIENT
Start: 2024-09-18 | End: 2024-09-18 | Stop reason: HOSPADM

## 2024-09-18 RX ORDER — MORPHINE SULFATE 4 MG/ML
4 INJECTION INTRAVENOUS
Status: DISCONTINUED | OUTPATIENT
Start: 2024-09-18 | End: 2024-09-18 | Stop reason: HOSPADM

## 2024-09-18 RX ORDER — ONDANSETRON 2 MG/ML
4 INJECTION INTRAMUSCULAR; INTRAVENOUS PRN
Status: DISCONTINUED | OUTPATIENT
Start: 2024-09-18 | End: 2024-09-18 | Stop reason: HOSPADM

## 2024-09-18 RX ORDER — OXYCODONE HYDROCHLORIDE 10 MG/1
10 TABLET ORAL
Status: DISCONTINUED | OUTPATIENT
Start: 2024-09-18 | End: 2024-09-18 | Stop reason: HOSPADM

## 2024-09-18 RX ORDER — MIDAZOLAM HYDROCHLORIDE 1 MG/ML
INJECTION INTRAMUSCULAR; INTRAVENOUS
Status: COMPLETED
Start: 2024-09-18 | End: 2024-09-18

## 2024-09-18 RX ORDER — OXYCODONE HYDROCHLORIDE 5 MG/1
5 TABLET ORAL
Status: DISCONTINUED | OUTPATIENT
Start: 2024-09-18 | End: 2024-09-18 | Stop reason: HOSPADM

## 2024-09-18 RX ORDER — ONDANSETRON 2 MG/ML
4 INJECTION INTRAMUSCULAR; INTRAVENOUS EVERY 8 HOURS PRN
Status: DISCONTINUED | OUTPATIENT
Start: 2024-09-18 | End: 2024-09-18 | Stop reason: HOSPADM

## 2024-09-18 RX ORDER — MIDAZOLAM HYDROCHLORIDE 1 MG/ML
.5-2 INJECTION INTRAMUSCULAR; INTRAVENOUS PRN
Status: DISCONTINUED | OUTPATIENT
Start: 2024-09-18 | End: 2024-09-18 | Stop reason: HOSPADM

## 2024-09-18 RX ADMIN — MIDAZOLAM HYDROCHLORIDE 1 MG: 2 INJECTION, SOLUTION INTRAMUSCULAR; INTRAVENOUS at 08:19

## 2024-09-18 RX ADMIN — FENTANYL CITRATE 50 MCG: 50 INJECTION, SOLUTION INTRAMUSCULAR; INTRAVENOUS at 08:19

## 2024-09-18 RX ADMIN — MIDAZOLAM HYDROCHLORIDE 1 MG: 2 INJECTION, SOLUTION INTRAMUSCULAR; INTRAVENOUS at 08:21

## 2024-09-18 RX ADMIN — FENTANYL CITRATE 50 MCG: 50 INJECTION, SOLUTION INTRAMUSCULAR; INTRAVENOUS at 08:21

## 2024-09-18 RX ADMIN — MIDAZOLAM HYDROCHLORIDE 1 MG: 1 INJECTION, SOLUTION INTRAMUSCULAR; INTRAVENOUS at 08:19

## 2024-09-18 ASSESSMENT — FIBROSIS 4 INDEX: FIB4 SCORE: 1.43

## 2024-09-18 NOTE — DISCHARGE INSTRUCTIONS
Needle Biopsy, Care After  These instructions tell you how to care for yourself after your procedure. Your doctor may give you more instructions. Call your doctor if you have any problems or questions.  What can I expect after the procedure?  After a needle biopsy, it is common to have these things at the puncture site:  Soreness.  Bruising.  Mild pain.  These things should go away after a few days.  Follow these instructions at home:  Puncture site care  Wash your hands with soap and water for at least 20 seconds before and after you change your bandage (dressing). If you cannot use soap and water, use hand .  Follow instructions from your doctor about how to take care of your puncture site. This includes:  When and how to change your bandage.  When to take off your bandage.  Okay to remove bandage after 24 hours. Okay to shower once dressing has been removed. Do not submerge in water for 5 days (baths, hot tubs, lakes, etc)  Check your puncture site every day for signs of infection. Check for:  Redness, swelling, or more pain.  Fluid or blood.  Warmth.  Pus or a bad smell.  General instructions  Go back to your normal activities when your doctor says that it is safe. Ask if there is anything you cannot do as you heal.  Take over-the-counter and prescription medicines only as told by your doctor.  Do not take baths, swim, or use a hot tub. Ask your doctor when you can shower.  Keep all follow-up visits. Ask if you need an appointment to get your biopsy results.  Contact a doctor if:  You have a fever.  You have redness, swelling, or more pain at the puncture site, and it lasts longer than a few days.  You have fluid, blood, or pus coming from the puncture site.  Your puncture site feels warm.  Get help right away if:  You have very bad bleeding from the puncture site.  Summary  After the procedure, it is common to have soreness, bruising, or mild pain at the puncture site.  Check your puncture site every  day for signs of infection, such as redness, swelling, or more pain.  Get help right away if you have very bad bleeding from your puncture site.  This information is not intended to replace advice given to you by your health care provider. Make sure you discuss any questions you have with your health care provider.

## 2024-09-18 NOTE — OR SURGEON
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       Procedure(s): CT guided liver biopsy.       Estimated Blood Loss: Less than 5 ml        Complications: None            9/18/2024     0826 AM     Michael Talley M.D.

## 2024-09-18 NOTE — PROGRESS NOTES
0835- Patient arrived to PACU. Report received. Attached to monitors. Verified parameters and alarms. Site is CDI and soft     0844- Patient tolerated PO fluids. Belongings returned to patient. Update provided to daughter, Amanda. All questions answered     1027- Site is clean, dry, and intact. Discharge instructions provided to patient and relative. Discussed follow up appointments, diet, medications and activity. Patient states understanding. IV was removed. Copy of discharge provided to the patient. A signed copy of discharge is in patient's chart. All personal belongings are in patients possession. All questions have been answered.     1040- Patient wheeled off floor by RN

## 2024-09-18 NOTE — PROGRESS NOTES
Patient was consented by MD in PPU. Pt transferred to CT table in supine position. Patient underwent a Liver Mass Biopsy by Dr. Talley. Procedure site was marked by MD and verified using imaging guidance. Pt placed on monitor, prepped and draped in a sterile fashion. Vitals were taken every 5 minutes and remained stable during procedure (see doc flow sheet for results). CO2 waveform capnography was monitored and remained WNL throughout procedure. Report called to PPU RN. Pt transported by stretcher with RN to PPU.     Specimen: x3 cores in formalin hand delivered to lab.    Total intraprocedure medication given:  Fentanyl 100mcg  Midazolam 2mg

## 2024-09-25 ENCOUNTER — HOSPICE ADMISSION (OUTPATIENT)
Dept: HOSPICE | Facility: HOSPICE | Age: 84
End: 2024-09-25
Payer: MEDICARE

## 2024-09-25 ENCOUNTER — HOSPITAL ENCOUNTER (OUTPATIENT)
Dept: HEMATOLOGY ONCOLOGY | Facility: MEDICAL CENTER | Age: 84
End: 2024-09-25
Attending: STUDENT IN AN ORGANIZED HEALTH CARE EDUCATION/TRAINING PROGRAM
Payer: MEDICARE

## 2024-09-25 VITALS
HEIGHT: 64 IN | TEMPERATURE: 98.5 F | OXYGEN SATURATION: 96 % | HEART RATE: 84 BPM | DIASTOLIC BLOOD PRESSURE: 68 MMHG | BODY MASS INDEX: 19.02 KG/M2 | SYSTOLIC BLOOD PRESSURE: 116 MMHG | WEIGHT: 111.4 LBS

## 2024-09-25 DIAGNOSIS — C23 GALLBLADDER CARCINOMA (HCC): ICD-10-CM

## 2024-09-25 PROCEDURE — 99417 PROLNG OP E/M EACH 15 MIN: CPT | Performed by: STUDENT IN AN ORGANIZED HEALTH CARE EDUCATION/TRAINING PROGRAM

## 2024-09-25 PROCEDURE — 99215 OFFICE O/P EST HI 40 MIN: CPT | Performed by: STUDENT IN AN ORGANIZED HEALTH CARE EDUCATION/TRAINING PROGRAM

## 2024-09-25 PROCEDURE — 99212 OFFICE O/P EST SF 10 MIN: CPT | Performed by: STUDENT IN AN ORGANIZED HEALTH CARE EDUCATION/TRAINING PROGRAM

## 2024-09-25 ASSESSMENT — ENCOUNTER SYMPTOMS
BACK PAIN: 0
HEADACHES: 0
ABDOMINAL PAIN: 0
FEVER: 0
INSOMNIA: 0
COUGH: 0
DIZZINESS: 0
WEAKNESS: 1
WHEEZING: 0
DOUBLE VISION: 0
WEIGHT LOSS: 1
BRUISES/BLEEDS EASILY: 0
BLURRED VISION: 0
DIARRHEA: 0
DIAPHORESIS: 0
CHILLS: 0
CONSTIPATION: 1
SPUTUM PRODUCTION: 0
ORTHOPNEA: 0
NAUSEA: 0
SINUS PAIN: 0
VOMITING: 0
BLOOD IN STOOL: 0
PALPITATIONS: 0
TINGLING: 0
NERVOUS/ANXIOUS: 0
SORE THROAT: 0
HEARTBURN: 0
SHORTNESS OF BREATH: 0
MYALGIAS: 0

## 2024-09-25 ASSESSMENT — FIBROSIS 4 INDEX: FIB4 SCORE: 1.45

## 2024-09-25 ASSESSMENT — PAIN SCALES - GENERAL: PAINLEVEL: NO PAIN

## 2024-09-25 NOTE — PROGRESS NOTES
Consult:  Hematology/Oncology      Referring Physician: ERIK Hernandez  Primary Care:  Dale Cornejo M.D.    Diagnosis: Gallbladder carcinoma    Chief Complaint:  Evaluation for systemic therapy    History of Presenting Illness:  Kadie Ashford is a 84 y.o.  woman who presents to the clinic for evaluation for systemic therapy for a new diagnosis of gallbladder adenocarcinoma. She was diagnosed after experiencing weight loss and loss of appetite over the past year. She has had occasional nausea as well. She developed right upper quadrant pain last month and then became jaundiced and so she went to the hospital. She was found to have a thickened gall bladder with liver metastases, and underwent stenting of her biliary tract. She then met with our intake oncology coordinator, who explained to her that her presentation looked consistent with a cancer of the biliary tract. She wanted to get a biopsy for confirmation before making any decisions so that was done last week, and showed adenocarcinoma consistent with biliary tract primary.     Interval History:  Patient is here for consultation. She feels okay. She doesn't have pain but she does have occasional nausea. She has some weakness as well, but otherwise is in good spirits. Her  and daughter are with her today.     Past Medical History:   Diagnosis Date    Breath shortness     with activity    Cataract     surgery    Heart burn     Indigestion     Jaundice     Pain 09/13/2024    gallbladder    Peptic ulcer     age 19    Psychiatric problem     anxiety    Snoring 2024    no sleep study       Past Surgical History:   Procedure Laterality Date    [place] ERCP,DIAGNOSTIC  08/31/2024    Procedure: ERCP (ENDOSCOPIC RETROGRADE CHOLANGIOPANCREATOGRAPHY);  Surgeon: Kobi Orourke M.D.;  Location: SURGERY [facility];  Service: Gastroenterology    OTHER  1959    vaguatomy and pyloroplasty    APPENDECTOMY      HYSTERECTOMY LAPAROSCOPY      age  43 - cyst on ovary    OTHER      Nose SX in 1959    TONSILLECTOMY         Social History     Socioeconomic History    Marital status:      Spouse name: Not on file    Number of children: Not on file    Years of education: Not on file    Highest education level: Not on file   Occupational History    Not on file   Tobacco Use    Smoking status: Former     Current packs/day: 0.00     Average packs/day: 0.2 packs/day for 5.0 years (1.0 ttl pk-yrs)     Types: Cigarettes     Start date:      Quit date:      Years since quittin.7    Smokeless tobacco: Never    Tobacco comments:     quit in  - smoked for 10 years total   Vaping Use    Vaping status: Never Used   Substance and Sexual Activity    Alcohol use: Not Currently     Comment: previous social at times    Drug use: No    Sexual activity: Not Currently   Other Topics Concern    Not on file   Social History Narrative    Not on file     Social Determinants of Health     Financial Resource Strain: Not on file   Food Insecurity: No Food Insecurity (2024)    Hunger Vital Sign     Worried About Running Out of Food in the Last Year: Never true     Ran Out of Food in the Last Year: Never true   Transportation Needs: No Transportation Needs (2024)    PRAPARE - Transportation     Lack of Transportation (Medical): No     Lack of Transportation (Non-Medical): No   Physical Activity: Not on file   Stress: Not on file   Social Connections: Not on file   Intimate Partner Violence: Not At Risk (2024)    Humiliation, Afraid, Rape, and Kick questionnaire     Fear of Current or Ex-Partner: No     Emotionally Abused: No     Physically Abused: No     Sexually Abused: No   Housing Stability: Low Risk  (2024)    Housing Stability Vital Sign     Unable to Pay for Housing in the Last Year: No     Number of Times Moved in the Last Year: 1     Homeless in the Last Year: No       Family History   Problem Relation Age of Onset    Cancer Neg Hx        OB  History   No obstetric history on file.       Allergies as of 09/25/2024 - Reviewed 09/25/2024   Allergen Reaction Noted    Food  10/08/2009    Sulfa drugs Rash 02/13/2010         Current Outpatient Medications:     ondansetron (ZOFRAN ODT) 4 MG TABLET DISPERSIBLE, Take 4 mg by mouth every 6 hours as needed for Nausea/Vomiting., Disp: , Rfl:     hydrOXYzine HCl (ATARAX) 25 MG Tab, Take 1 Tablet by mouth 2 times a day as needed., Disp: , Rfl:     omeprazole (PRILOSEC) 20 MG delayed-release capsule, Take 20 mg by mouth every day., Disp: , Rfl:     vitamin D3 (CHOLECALCIFEROL) 1000 Unit (25 mcg) Tab, Take 2,000 Units by mouth every day., Disp: , Rfl:     ibuprofen (MOTRIN) 200 MG Tab, Take 200 mg by mouth every 6 hours as needed for Mild Pain., Disp: , Rfl:     Polyethyl Glycol-Propyl Glycol (LUBRICATING EYE DROPS OP), Administer 1 Drop into affected eye(s) 1 time a day as needed (dry eyes)., Disp: , Rfl:     Review of Systems:  Review of Systems   Constitutional:  Positive for malaise/fatigue and weight loss. Negative for chills, diaphoresis and fever.   HENT:  Negative for hearing loss, nosebleeds, sinus pain and sore throat.    Eyes:  Negative for blurred vision and double vision.   Respiratory:  Negative for cough, sputum production, shortness of breath and wheezing.    Cardiovascular:  Negative for chest pain, palpitations, orthopnea and leg swelling.   Gastrointestinal:  Positive for constipation. Negative for abdominal pain, blood in stool, diarrhea, heartburn, melena, nausea and vomiting.   Genitourinary:  Negative for dysuria, frequency, hematuria and urgency.   Musculoskeletal:  Negative for back pain, joint pain and myalgias.   Skin:  Negative for rash.   Neurological:  Positive for weakness. Negative for dizziness, tingling and headaches.   Endo/Heme/Allergies:  Does not bruise/bleed easily.   Psychiatric/Behavioral:  The patient is not nervous/anxious and does not have insomnia.           Physical  "Exam:  Vitals:    09/25/24 0803   BP: 116/68   BP Location: Left arm   Patient Position: Sitting   BP Cuff Size: Adult   Pulse: 84   Temp: 36.9 °C (98.5 °F)   TempSrc: Temporal   SpO2: 96%   Weight: 50.5 kg (111 lb 6.4 oz)   Height: 1.626 m (5' 4.02\")       DESC; KARNOFSKY SCALE WITH ECOG EQUIVALENT: 60, Requires occasional assistance, but is able to care for most of his personal needs (ECOG equivalent 2)    DISTRESS LEVEL: no apparent distress    Physical Exam  Vitals and nursing note reviewed.   Constitutional:       General: She is awake. She is not in acute distress.     Appearance: Normal appearance. She is underweight. She is not ill-appearing, toxic-appearing or diaphoretic.   HENT:      Head: Normocephalic and atraumatic.      Nose: Nose normal. No congestion.      Mouth/Throat:      Pharynx: Oropharynx is clear. No oropharyngeal exudate or posterior oropharyngeal erythema.   Eyes:      General: No scleral icterus.     Extraocular Movements: Extraocular movements intact.      Conjunctiva/sclera: Conjunctivae normal.      Pupils: Pupils are equal, round, and reactive to light.   Cardiovascular:      Rate and Rhythm: Normal rate. Rhythm irregularly irregular.      Pulses: Normal pulses.      Heart sounds: Normal heart sounds. No murmur heard.     No friction rub. No gallop.   Pulmonary:      Effort: Pulmonary effort is normal.      Breath sounds: No decreased air movement. Examination of the right-upper field reveals wheezing. Examination of the left-upper field reveals wheezing. Wheezing present. No rhonchi or rales.   Abdominal:      General: Bowel sounds are normal. There is no distension.      Tenderness: There is no abdominal tenderness.   Musculoskeletal:         General: No deformity. Normal range of motion.      Cervical back: Normal range of motion and neck supple. No tenderness.      Right lower leg: No edema.      Left lower leg: No edema.   Lymphadenopathy:      Cervical: No cervical adenopathy.    "   Upper Body:      Right upper body: No axillary adenopathy.      Left upper body: No axillary adenopathy.      Lower Body: No right inguinal adenopathy. No left inguinal adenopathy.   Skin:     General: Skin is warm and dry.      Coloration: Skin is not jaundiced.      Findings: No erythema or rash.   Neurological:      General: No focal deficit present.      Mental Status: She is alert and oriented to person, place, and time.      Sensory: Sensation is intact.      Motor: Weakness present.      Gait: Gait abnormal (Ambulating with a cane).   Psychiatric:         Attention and Perception: Attention normal.         Mood and Affect: Mood normal.         Behavior: Behavior normal. Behavior is cooperative.         Thought Content: Thought content normal.         Judgment: Judgment normal.          Depression Screening    Little interest or pleasure in doing things?      Feeling down, depressed , or hopeless?     Trouble falling or staying asleep, or sleeping too much?      Feeling tired or having little energy?      Poor appetite or overeating?      Feeling bad about yourself - or that you are a failure or have let yourself or your family down?     Trouble concentrating on things, such as reading the newspaper or watching television?     Moving or speaking so slowly that other people could have noticed.  Or the opposite - being so fidgety or restless that you have been moving around a lot more than usual?      Thoughts that you would be better off dead, or of hurting yourself?      Patient Health Questionnaire Score:         If depressive symptoms identified deferred to follow up visit unless specifically addressed in assesment and plan.    Interpretation of PHQ-9 Total Score   Score Severity   1-4 No Depression   5-9 Mild Depression   10-14 Moderate Depression   15-19 Moderately Severe Depression   20-27 Severe Depression    Labs:  No visits with results within 7 Day(s) from this visit.   Latest known visit with  results is:   Admission on 09/18/2024, Discharged on 09/18/2024   Component Date Value Ref Range Status    WBC 09/18/2024 11.6 (H)  4.8 - 10.8 K/uL Final    RBC 09/18/2024 3.77 (L)  4.20 - 5.40 M/uL Final    Hemoglobin 09/18/2024 9.8 (L)  12.0 - 16.0 g/dL Final    Hematocrit 09/18/2024 31.5 (L)  37.0 - 47.0 % Final    MCV 09/18/2024 83.6  81.4 - 97.8 fL Final    MCH 09/18/2024 26.0 (L)  27.0 - 33.0 pg Final    MCHC 09/18/2024 31.1 (L)  32.2 - 35.5 g/dL Final    RDW 09/18/2024 52.2 (H)  35.9 - 50.0 fL Final    Platelet Count 09/18/2024 414  164 - 446 K/uL Final    MPV 09/18/2024 8.7 (L)  9.0 - 12.9 fL Final    PT 09/18/2024 15.1 (H)  12.0 - 14.6 sec Final    INR 09/18/2024 1.18 (H)  0.87 - 1.13 Final    Comment: INR - Non-therapeutic Reference Range: 0.87-1.13  INR - Therapeutic Reference Range: 2.0-4.0      Pathology Request 09/18/2024 Sent to Histo   Final       Imaging:   All listed images below have been independently reviewed by me. I agree with the findings as summarized below:    CT-NEEDLE BX-LIVER    Result Date: 9/18/2024 9/18/2024 8:09 AM HISTORY/REASON FOR EXAM:  Liver masses, gallbladder mass and lymph nodes highly concerning for malignancy; Per IR MD discretion. TECHNIQUE/EXAM DESCRIPTION: Right hepatic mass biopsy with CT guidance. Low dose optimization technique was utilized for this CT exam including automated exposure control and adjustment of the mA and/or kV according to patient size. PROCEDURE: Informed consent was obtained. Moderate sedation was provided. Pulse oximetry and continuous cardiac monitoring by the nurse was performed throughout the exam. Intraservice time was 20 minutes. Localizing CT images were obtained with the patient in supine position. The skin was prepped with ChloraPrep and draped in a sterile fashion. Following local anesthesia with 1% Lidocaine, a 17 G guiding needle was placed and needle position confirmed with CT. Using coaxial technique, an 18 G core biopsy needle was  placed into the target lesion in the right hepatic lobe and needle position confirmed with CT. A total of 3 samples were obtained in this fashion and submitted in formalin. Gelfoam slurry was prophylactically injected into the biopsy site to aid in hemostasis. The guiding needle was removed and limited CT images obtained through the biopsy site show no evidence of significant parenchymal hemorrhage. The patient tolerated the procedure well. COMPARISON: None available. COMPLICATIONS: No immediate complications. FINDINGS: The localizing CT images show satisfactory needle position within the target lesion.     CT GUIDED RIGHT HEPATIC MASS BIOPSY.    DX-PORTABLE FLUORO > 1 HOUR    Result Date: 9/3/2024  8/31/2024 10:35 AM HISTORY/REASON FOR EXAM:  ERCP TECHNIQUE/EXAM DESCRIPTION AND NUMBER OF VIEWS: Portable fluoroscopy for greater than one hour in OR. FINDINGS: The portable fluoroscopy unit was obligated to the procedure for greater than one hour. Actual fluoro time was 7.3 minutes. Fluoroscopy dose(DAP): 7.6293 Gy*cm^2     Portable fluoroscopy utilized for 7.3 minutes. [facility] [address] [facility] [place] [address]    [id number] DUCTS    Result Date: 9/3/2024  8/31/2024 10:35 AM HISTORY/REASON FOR EXAM:  ERCP TECHNIQUE/EXAM DESCRIPTION AND NUMBER OF VIEWS: 8 images for ERCP Digitized Intraoperative Radiograph FINDINGS: Fluoroscopic time: 7.3 minutes Fluoroscopy dose(DAP): 7.6293 Gy*cm^2     Digitized intraoperative radiograph is submitted for review. This examination is not for diagnostic purpose but for guidance during a surgical procedure. Please see the patient's chart for full procedural details. [facility] [address] [facility] [place] [address]    CT-ABDOMEN-PELVIS WITH    Result Date: 8/29/2024 8/29/2024 3:42 PM HISTORY/REASON FOR EXAM:  hariandice. Abdominal pain TECHNIQUE/EXAM DESCRIPTION:   CT scan of the abdomen and pelvis with contrast. Contrast-enhanced helical scanning was obtained from the  diaphragmatic domes through the pubic symphysis following the bolus administration of nonionic contrast without complication. 100 mL of Omnipaque 350 nonionic contrast was administered without complication. Low dose optimization technique was utilized for this CT exam including automated exposure control and adjustment of the mA and/or kV according to patient size. COMPARISON: No prior studies available. FINDINGS: Lower Chest: There is mild atelectasis versus scarring at the lung bases. There are a few tiny nodules measuring 2 mm. There is also a 5 mm pleural-based nodular density in the left lower lobe on image #6. Liver: There is marked heterogeneity with confluent masses predominantly involving the right hepatic lobe inferiorly. This is difficult to quantify. Additional low-density lesions are present which may represent cysts. The liver is enlarged measuring 20.2 cm. Spleen: Unremarkable. Pancreas: Somewhat atrophic. Gallbladder: There is a 3.8 x 3.2 cm mass within the gallbladder. This is confluent with the hepatic abnormality. It is unclear if this represents a hepatic neoplasm invading the gallbladder or gallbladder neoplasm invading the liver. Biliary: There is intrahepatic biliary ductal dilatation without dilatation of the extrahepatic biliary ducts. Adrenal glands: Mild thickening without a discrete mass. Kidneys: The kidneys enhance symmetrically. There are probable parapelvic cysts on the left. Bowel: No obstruction or acute inflammation. Lymph nodes: There is upper abdominal lymphadenopathy in the region of the gastrohepatic ligament, heidy hepatis, and portacaval regions. Gastrohepatic ligament lymph node measures 3.2 x 2.8 cm. There is a heidy hepatis lymph node measuring 4.4 x 2.5 cm.  There is a portacaval lymph node measuring 4.2 x 2.9 cm. Lymph nodes compress the portal vein and probably the common hepatic/common bile duct. Vasculature: Atherosclerosis including coronary artery disease. There is  no aneurysmal dilatation of the aorta. Peritoneum: Unremarkable without ascites or free intraperitoneal air. Pelvis: Patient is status post hysterectomy. Urinary bladder is unremarkable. Musculoskeletal: Degenerative changes are noted in the spine as well as about the hips and sacroiliac joints. There is anterior compression deformity of T12 which appears chronic.     1. There is extensive neoplastic involvement of the liver and gallbladder. 2. Intrahepatic biliary ductal dilatation. 3. Upper abdominal lymphadenopathy. 4. Atherosclerosis including coronary artery disease.    DX-CHEST-PORTABLE (1 VIEW)    Result Date: 8/29/2024 8/29/2024 2:20 PM HISTORY/REASON FOR EXAM:  Shortness of Breath. TECHNIQUE/EXAM DESCRIPTION AND NUMBER OF VIEWS: Single portable view of the chest. COMPARISON: None FINDINGS: The cardiomediastinal silhouette is within normal limits. There is mild atelectasis versus infiltrate at the left lung base. There is no definite pleural effusion or pneumothorax.     Mild left basilar atelectasis versus infiltrate.       Pathology:    FINAL DIAGNOSIS:     A. Liver mass biopsy:          Metastatic carcinoma with intestinal differentiation (see           comment).     Comment:   This case has been reviewed by a second pathologist, Dr. Chan, who   concurs with the findings.     The tumor cells are positive for CDX2 and predominantly CK20 positive,   which although not specific, suggest a differential diagnosis   consisting of a colonic adenocarcinoma or a tumor of pancreatobiliary   origin, as well as other less likely candidates. Correlation with   clinical findings is recommended.     Block A2 has tumor available for additional studies, if clinically   indicated.                                          Diagnosis performed by:                                       LUANN MAYFIELD     Assessment & Plan:  1. Gallbladder carcinoma (HCC)            This is an 84 year old  woman with gallbladder  adenocarcinoma, at least [id number] stage IV-b disease. She presents for evaluation.     Current Diagnosis and Staging: Gallbladder adenocarcinoma, [id number] stage IV-B disease    Treatment Plan: Hospice care    Treatment Citation: NCCN    Plan of Care:    Primary Therapy: NA  Supportive Therapy: Hospice care  Toxicity: NA  Labs: NA  Imaging: NA  Treatment Planning: Patient has aggressive gallbladder carcinoma and does not want to pursue systemic therapy, which I absolutely agree with. Hospice care referral placed.   Consultations: NA  Code Status: Full  Miscellaneous: NA  Return for Follow Up: PRN    Any questions and concerns raised by the patient were answered to the best of my ability. Thank you for allowing me to participate in the care for this patient. Please feel free to contact me for any questions or concerns.     Total time spent on chart review, clinic encounter, and documentation: 61 minutes.

## 2024-09-25 NOTE — ADDENDUM NOTE
Encounter addended by: Maya Rader, Med Ass't on: 9/25/2024 10:22 AM   Actions taken: Charge Capture section accepted

## 2024-09-30 ENCOUNTER — PATIENT OUTREACH (OUTPATIENT)
Dept: ONCOLOGY | Facility: MEDICAL CENTER | Age: 84
End: 2024-09-30
Payer: MEDICARE

## 2024-10-01 ENCOUNTER — PATIENT OUTREACH (OUTPATIENT)
Dept: ONCOLOGY | Facility: MEDICAL CENTER | Age: 84
End: 2024-10-01
Payer: MEDICARE

## 2024-10-01 DIAGNOSIS — Z65.8 PSYCHOSOCIAL DISTRESS: ICD-10-CM

## 2024-10-03 ENCOUNTER — HOME CARE VISIT (OUTPATIENT)
Dept: HOSPICE | Facility: HOSPICE | Age: 84
End: 2024-10-03
Payer: MEDICARE

## 2024-10-11 ENCOUNTER — HOME CARE VISIT (OUTPATIENT)
Dept: HOSPICE | Facility: HOSPICE | Age: 84
End: 2024-10-11
Payer: MEDICARE

## 2024-10-12 ENCOUNTER — HOME CARE VISIT (OUTPATIENT)
Dept: HOSPICE | Facility: HOSPICE | Age: 84
End: 2024-10-12
Payer: MEDICARE

## 2024-10-17 ENCOUNTER — HOME CARE VISIT (OUTPATIENT)
Dept: HOSPICE | Facility: HOSPICE | Age: 84
End: 2024-10-17
Payer: MEDICARE

## 2024-10-17 ENCOUNTER — PHARMACY VISIT (OUTPATIENT)
Dept: PHARMACY | Facility: MEDICAL CENTER | Age: 84
End: 2024-10-17
Payer: COMMERCIAL

## 2024-10-17 VITALS — RESPIRATION RATE: 14 BRPM

## 2024-10-17 DIAGNOSIS — Z51.5 HOSPICE CARE: ICD-10-CM

## 2024-10-17 DIAGNOSIS — C23 GALLBLADDER CARCINOMA (HCC): Primary | ICD-10-CM

## 2024-10-17 PROCEDURE — RXMED WILLOW AMBULATORY MEDICATION CHARGE: Performed by: STUDENT IN AN ORGANIZED HEALTH CARE EDUCATION/TRAINING PROGRAM

## 2024-10-17 PROCEDURE — 665036 HSPC NOTICE OF ELECTION NOE

## 2024-10-17 PROCEDURE — G0299 HHS/HOSPICE OF RN EA 15 MIN: HCPCS

## 2024-10-17 PROCEDURE — S9126 HOSPICE CARE, IN THE HOME, P: HCPCS

## 2024-10-17 RX ORDER — MIRTAZAPINE 15 MG/1
15 TABLET, FILM COATED ORAL NIGHTLY
Qty: 30 TABLET | Refills: 11 | Status: SHIPPED | OUTPATIENT
Start: 2024-10-17

## 2024-10-17 RX ORDER — LORAZEPAM 2 MG/ML
.5-1 CONCENTRATE ORAL
Qty: 360 ML | Refills: 0 | Status: SHIPPED | OUTPATIENT
Start: 2024-10-17 | End: 2025-10-17

## 2024-10-17 RX ORDER — BISACODYL 10 MG
10 SUPPOSITORY, RECTAL RECTAL PRN
Qty: 5 SUPPOSITORY | Refills: 10 | Status: SHIPPED | OUTPATIENT
Start: 2024-10-17 | End: 2025-10-17

## 2024-10-17 RX ORDER — MORPHINE SULFATE 100 MG/5ML
5-10 SOLUTION ORAL
Qty: 240 ML | Refills: 0 | Status: SHIPPED | OUTPATIENT
Start: 2024-10-17 | End: 2025-10-17

## 2024-10-17 RX ORDER — SENNA AND DOCUSATE SODIUM 50; 8.6 MG/1; MG/1
2 TABLET, FILM COATED ORAL 2 TIMES DAILY PRN
Qty: 28 TABLET | Refills: 10 | Status: SHIPPED | OUTPATIENT
Start: 2024-10-17 | End: 2025-10-17

## 2024-10-17 RX ORDER — ONDANSETRON 4 MG/1
4 TABLET, ORALLY DISINTEGRATING ORAL EVERY 6 HOURS PRN
Qty: 15 TABLET | Refills: 10 | Status: SHIPPED | OUTPATIENT
Start: 2024-10-17 | End: 2025-10-17

## 2024-10-17 RX ORDER — ACETAMINOPHEN 650 MG/1
650 SUPPOSITORY RECTAL EVERY 6 HOURS PRN
Qty: 5 SUPPOSITORY | Refills: 10 | Status: SHIPPED | OUTPATIENT
Start: 2024-10-17

## 2024-10-17 RX ORDER — ACETAMINOPHEN 500 MG
1000 TABLET ORAL EVERY 6 HOURS PRN
Qty: 30 TABLET | Refills: 10 | Status: SHIPPED | OUTPATIENT
Start: 2024-10-17 | End: 2025-10-17

## 2024-10-17 SDOH — ECONOMIC STABILITY: GENERAL

## 2024-10-17 ASSESSMENT — PATIENT HEALTH QUESTIONNAIRE - PHQ9
CLINICAL INTERPRETATION OF PHQ2 SCORE: 2
5. POOR APPETITE OR OVEREATING: 1 - SEVERAL DAYS
SUM OF ALL RESPONSES TO PHQ QUESTIONS 1-9: 5

## 2024-10-17 ASSESSMENT — SOCIAL DETERMINANTS OF HEALTH (SDOH): ACTIVE STRESSOR - HEALTH CHANGES: 1

## 2024-10-17 ASSESSMENT — ACTIVITIES OF DAILY LIVING (ADL): MONEY MANAGEMENT (EXPENSES/BILLS): NEEDS ASSISTANCE

## 2024-10-17 ASSESSMENT — ENCOUNTER SYMPTOMS
MUSCLE WEAKNESS: 1
FATIGUE: 1
BOWEL PATTERN NORMAL: 1
STOOL FREQUENCY: DAILY
DENIES PAIN: 1
FATIGUES EASILY: 1
LAST BOWEL MOVEMENT: 67130

## 2024-10-18 ENCOUNTER — HOME CARE VISIT (OUTPATIENT)
Dept: HOSPICE | Facility: HOSPICE | Age: 84
End: 2024-10-18
Payer: MEDICARE

## 2024-10-18 PROCEDURE — S9126 HOSPICE CARE, IN THE HOME, P: HCPCS

## 2024-10-19 PROCEDURE — S9126 HOSPICE CARE, IN THE HOME, P: HCPCS

## 2024-10-20 PROCEDURE — S9126 HOSPICE CARE, IN THE HOME, P: HCPCS

## 2024-10-21 ENCOUNTER — HOME CARE VISIT (OUTPATIENT)
Dept: HOSPICE | Facility: HOSPICE | Age: 84
End: 2024-10-21
Payer: MEDICARE

## 2024-10-21 PROCEDURE — S9126 HOSPICE CARE, IN THE HOME, P: HCPCS

## 2024-10-22 ENCOUNTER — HOME CARE VISIT (OUTPATIENT)
Dept: HOSPICE | Facility: HOSPICE | Age: 84
End: 2024-10-22
Payer: MEDICARE

## 2024-10-22 VITALS
HEART RATE: 64 BPM | DIASTOLIC BLOOD PRESSURE: 50 MMHG | RESPIRATION RATE: 20 BRPM | BODY MASS INDEX: 18.63 KG/M2 | SYSTOLIC BLOOD PRESSURE: 110 MMHG | WEIGHT: 108.6 LBS

## 2024-10-22 PROCEDURE — S9126 HOSPICE CARE, IN THE HOME, P: HCPCS

## 2024-10-22 PROCEDURE — G0299 HHS/HOSPICE OF RN EA 15 MIN: HCPCS

## 2024-10-22 ASSESSMENT — ENCOUNTER SYMPTOMS
BOWEL PATTERN NORMAL: 1
FATIGUES EASILY: 1
MUSCLE WEAKNESS: 1
LAST BOWEL MOVEMENT: 67133
DENIES PAIN: 1
DIZZINESS: 1
STOOL FREQUENCY: LESS THAN DAILY
FORGETFULNESS: 1
FATIGUE: 1

## 2024-10-22 ASSESSMENT — ACTIVITIES OF DAILY LIVING (ADL): AMBULATION ASSISTANCE: ONE PERSON

## 2024-10-22 ASSESSMENT — FIBROSIS 4 INDEX: FIB4 SCORE: 1.45

## 2024-10-23 PROCEDURE — S9126 HOSPICE CARE, IN THE HOME, P: HCPCS

## 2024-10-24 PROCEDURE — S9126 HOSPICE CARE, IN THE HOME, P: HCPCS

## 2024-10-25 PROCEDURE — S9126 HOSPICE CARE, IN THE HOME, P: HCPCS

## 2024-10-26 PROCEDURE — S9126 HOSPICE CARE, IN THE HOME, P: HCPCS

## 2024-10-27 PROCEDURE — S9126 HOSPICE CARE, IN THE HOME, P: HCPCS

## 2024-10-28 PROCEDURE — S9126 HOSPICE CARE, IN THE HOME, P: HCPCS

## 2024-10-29 ENCOUNTER — HOME CARE VISIT (OUTPATIENT)
Dept: HOSPICE | Facility: HOSPICE | Age: 84
End: 2024-10-29
Payer: MEDICARE

## 2024-10-29 PROCEDURE — S9126 HOSPICE CARE, IN THE HOME, P: HCPCS

## 2024-10-30 PROCEDURE — S9126 HOSPICE CARE, IN THE HOME, P: HCPCS

## 2024-10-31 PROCEDURE — S9126 HOSPICE CARE, IN THE HOME, P: HCPCS

## 2024-11-01 PROCEDURE — S9126 HOSPICE CARE, IN THE HOME, P: HCPCS

## 2024-11-02 PROCEDURE — S9126 HOSPICE CARE, IN THE HOME, P: HCPCS

## 2024-11-03 PROCEDURE — S9126 HOSPICE CARE, IN THE HOME, P: HCPCS

## 2024-11-04 PROCEDURE — S9126 HOSPICE CARE, IN THE HOME, P: HCPCS

## 2024-11-05 PROCEDURE — S9126 HOSPICE CARE, IN THE HOME, P: HCPCS

## 2024-11-06 PROCEDURE — S9126 HOSPICE CARE, IN THE HOME, P: HCPCS

## 2024-11-07 PROCEDURE — S9126 HOSPICE CARE, IN THE HOME, P: HCPCS

## 2024-11-08 PROCEDURE — S9126 HOSPICE CARE, IN THE HOME, P: HCPCS

## 2024-11-09 PROCEDURE — S9126 HOSPICE CARE, IN THE HOME, P: HCPCS

## 2024-11-10 PROCEDURE — S9126 HOSPICE CARE, IN THE HOME, P: HCPCS

## 2024-11-11 PROCEDURE — S9126 HOSPICE CARE, IN THE HOME, P: HCPCS

## 2024-11-12 PROCEDURE — S9126 HOSPICE CARE, IN THE HOME, P: HCPCS

## 2024-11-13 PROCEDURE — S9126 HOSPICE CARE, IN THE HOME, P: HCPCS

## 2024-11-14 PROCEDURE — S9126 HOSPICE CARE, IN THE HOME, P: HCPCS

## 2024-11-15 PROCEDURE — S9126 HOSPICE CARE, IN THE HOME, P: HCPCS

## 2024-11-16 PROCEDURE — S9126 HOSPICE CARE, IN THE HOME, P: HCPCS

## 2024-11-17 PROCEDURE — S9126 HOSPICE CARE, IN THE HOME, P: HCPCS

## 2024-11-18 PROCEDURE — S9126 HOSPICE CARE, IN THE HOME, P: HCPCS

## 2024-11-19 PROCEDURE — S9126 HOSPICE CARE, IN THE HOME, P: HCPCS

## 2024-11-20 PROCEDURE — S9126 HOSPICE CARE, IN THE HOME, P: HCPCS

## 2024-11-21 PROCEDURE — S9126 HOSPICE CARE, IN THE HOME, P: HCPCS

## 2024-11-22 PROCEDURE — S9126 HOSPICE CARE, IN THE HOME, P: HCPCS

## 2024-11-23 PROCEDURE — S9126 HOSPICE CARE, IN THE HOME, P: HCPCS

## 2024-11-24 PROCEDURE — S9126 HOSPICE CARE, IN THE HOME, P: HCPCS

## 2024-11-25 PROCEDURE — S9126 HOSPICE CARE, IN THE HOME, P: HCPCS

## 2024-12-17 ENCOUNTER — APPOINTMENT (OUTPATIENT)
Dept: HOSPICE | Facility: HOSPICE | Age: 84
End: 2024-12-17
Payer: MEDICARE

## (undated) DEVICE — WATER IRRIGATION STERILE 1000ML (12EA/CA)

## (undated) DEVICE — EXTRACTOR PRO XL 9-12 MM ABOVE

## (undated) DEVICE — TUBING CLEARLINK DUO-VENT - C-FLO (48EA/CA)

## (undated) DEVICE — BLOCK BITE ENDOSCOPIC 2809 - (100/BX) INTERMEDIATE

## (undated) DEVICE — SYRINGE SAFETY 5 ML 18 GA X 1-1/2 BLUNT LL (100/BX 4BX/CA)

## (undated) DEVICE — COVER LIGHT HANDLE FLEXIBLE - SOFT (2EA/PK 80PK/CA)

## (undated) DEVICE — GUIDEWIRE .025X450CM JAGWIRE REVOLUTION (2EA/BX)

## (undated) DEVICE — ELECTRODE DUAL RETURN W/ CORD - (50/PK)

## (undated) DEVICE — LACTATED RINGERS INJ 1000 ML - (14EA/CA 60CA/PF)

## (undated) DEVICE — SPHINCTEROTOME CLEVER CUT

## (undated) DEVICE — INTRODUCER STENT NAVIFLEX 7FR

## (undated) DEVICE — TUBE SUCTION YANKAUER 1/4 X 6FT (50EA/CA)"

## (undated) DEVICE — GOWN SURGEONS LARGE - (32/CA)

## (undated) DEVICE — SYRINGE 12 CC LUER TIP - (80/BX) OBSOLETE ITEM

## (undated) DEVICE — KIT CUSTOM PROCEDURE SINGLE FOR ENDO (15/CA)

## (undated) DEVICE — SYRINGE SAFETY 10 ML 18 GA X 1 1/2 BLUNT LL (100/BX 4BX/CA)

## (undated) DEVICE — SYRINGE SAFETY 3 ML 18 GA X 1 1/2 BLUNT LL (100/BX 8BX/CA)

## (undated) DEVICE — MASK WITH FACE SHIELD (25/BX 4BX/CA)

## (undated) DEVICE — KIT  I.V. START (100EA/CA)

## (undated) DEVICE — SYRINGE DISP. 60 CC LL - (30/BX, 12BX/CA)**WHEN THESE ARE GONE ORDER #500206**

## (undated) DEVICE — SENSOR OXIMETER ADULT SPO2 RD SET (20EA/BX)

## (undated) DEVICE — TOWEL STOP TIMEOUT SAFETY FLAG (40EA/CA)

## (undated) DEVICE — FORCEP RADIAL JAW 4 STANDARD CAPACITY W/NEEDLE 240CM (40EA/BX)

## (undated) DEVICE — SPONGE GAUZE NON-STERILE 4X4 - (2000/CA 10PK/CA)